# Patient Record
Sex: MALE | Race: WHITE | NOT HISPANIC OR LATINO
[De-identification: names, ages, dates, MRNs, and addresses within clinical notes are randomized per-mention and may not be internally consistent; named-entity substitution may affect disease eponyms.]

---

## 2019-06-05 ENCOUNTER — APPOINTMENT (OUTPATIENT)
Dept: ORTHOPEDIC SURGERY | Facility: CLINIC | Age: 84
End: 2019-06-05
Payer: MEDICARE

## 2019-06-05 DIAGNOSIS — Z60.2 PROBLEMS RELATED TO LIVING ALONE: ICD-10-CM

## 2019-06-05 DIAGNOSIS — Z87.39 PERSONAL HISTORY OF OTHER DISEASES OF THE MUSCULOSKELETAL SYSTEM AND CONNECTIVE TISSUE: ICD-10-CM

## 2019-06-05 PROCEDURE — 20610 DRAIN/INJ JOINT/BURSA W/O US: CPT | Mod: RT

## 2019-06-05 PROCEDURE — 73562 X-RAY EXAM OF KNEE 3: CPT | Mod: LT

## 2019-06-05 PROCEDURE — 99203 OFFICE O/P NEW LOW 30 MIN: CPT | Mod: 25

## 2019-06-05 PROCEDURE — 73564 X-RAY EXAM KNEE 4 OR MORE: CPT | Mod: RT

## 2019-06-05 SDOH — SOCIAL STABILITY - SOCIAL INSECURITY: PROBLEMS RELATED TO LIVING ALONE: Z60.2

## 2019-06-05 NOTE — ADDENDUM
[FreeTextEntry1] : This note was written by Maine Ying on 06/05/2019 acting as scribe for Dr. Carson Lombardi M.D.\par \par I, Dr. Carson Lombardi M.D., have read and attest that all the information, medical decision making and discharge instructions within are true and accurate.\par

## 2019-06-05 NOTE — PROCEDURE
[de-identified] : RIGHT KNEE CORTISONE INJECTION\par Discussed at length with the patient the planned steroid and lidocaine injection. The risks, benefits, convalescence and alternatives were reviewed. The possible side effects discussed included but were not limited to: pain, swelling, heat and redness. There symptoms are generally mild but if they are extensive then contact the office. Giving pain relievers by mouth such as NSAID’s or Tylenol can generally treat the reactions to  steroid and lidocaine. Rare cases of infection have been noted. Rash, hives and itching may occur post injection. If you have muscle pain or cramps, flushing and or swelling of the face, rapid heart beat, nausea, dizziness, fever, chills, headache, difficulty breathing, swelling in the arms or legs, or have a prickly feeling of your skin, contact a health care provider immediately.\par  \par Following this discussion, the knee was prepped with betadine and under sterile conditions 9 cc of 1% lidocaine and 1 cc (40 mg) of Depo-Medrol were injected with a 21 gauge needle. The needle was introduced into the joint, aspiration was performed to ensure intra-articular placement and the medication was injected. Upon withdrawal of the needle the site was cleaned with alcohol and a bandaid applied. The patient tolerated the injection well and there were no adverse effects. Post injection instructions included no strenuous activity for 24 hours, cryotherapy and if there are any adverse effects to contact the office.

## 2019-06-05 NOTE — HISTORY OF PRESENT ILLNESS
[de-identified] : 83 year old male presents for evaluation of bilateral knee pain, R>L for the past few years. Patient had a left TKR with Dr. Lombardi in 2011. He describes several falls over several years. He locates his right knee pain diffusely throughout the joint and around the kneecap. He describes intermittent left knee pain which is located diffusely.  His pain is a dull discomfort with symptoms of stiffness, right knee buckling, as well as loss of motion. He can ambulate < 1 block and takes the stairs one at a time using the handrail without a cane, walker, or brace. Currently using Tylenol prn and has history of a right knee cortisone injection last year followed by viscosupplementation at [facility]. He feels his right knee is becoming a progressive problem, and has been becoming progressively weak. Today, he would like to discuss his treatment options with Dr. Lombadri, including TKR.

## 2019-06-05 NOTE — PHYSICAL EXAM
[de-identified] : General appearance: well nourished and hydrated, pleasant, alert and oriented x 3, cooperative.\par HEENT: Normocephalic, EOM intact, Nasal septum midline, Oral cavity clear, External auditory canal clear.\par Cardiovascular: no apparent abnormalities, no lower leg edema, no varicosities, pedal pulses are decreased. \par Lymphatics Lymph nodes: none palpated, Lymphedema: not present.\par Neurologic: sensation is normal, no muscle weakness in upper or lower extremities, patella tendon reflexes intact .\par Dermatologic no apparent skin lesions, moist, warm, no rash.\par Spine: cervical spine appears normal and moves freely, thoracic spine appears normal and moves freely, lumbosacral spine appears normal and moves freely.\par Gait: nonantalgic.\par \par Left knee\par Inspection: no effusion or erythema.\par Wounds: healed midline incision \par Alignment: normal.\par Palpation: no specific tenderness on palpation.\par ROM active (in degrees): 0-130\par Ligamentous laxity: all ligaments appear stable,, negative ant. drawer test, negative post. drawer test, stable to varus stress test, stable to valgus stress test.\par Patellofemoral Alignment Test: Q angle-, normal.\par Muscle Test: good quad strength.\par Leg examination: calf is soft and non-tender.\par \par Right knee\par Inspection: trace effusion \par Wounds: none.\par Alignment: normal.\par Palpation: no specific tenderness on palpation.\par ROM active (in degrees): 0-130 with crepitus and discomfort \par Ligamentous laxity: all ligaments appear stable,, negative ant. drawer test, negative post. drawer test, stable to varus stress test, stable to valgus stress test. negative Lachman's test, negative pivot shift test\par Meniscal Test: negative McMurrays, negative Jared.\par Patellofemoral Alignment Test: Q angle-, normal.\par Muscle Test: good quad strength.\par Leg examination: calf is soft and non-tender.\par \par Left hip\par Inspection: No swelling or ecchymosis.\par Wounds: none.\par Palpation: non-tender.\par Stability: no instability.\par Strength: 5/5 all motor groups.\par ROM: no pain with FROM.\par Leg length: equal.\par \par Right hip\par Inspection: No swelling or ecchymosis.\par Wounds: none.\par Palpation: non-tender.\par Stability: no instability.\par Strength: 5/5 all motor groups.\par ROM: no pain with FROM.\par Leg length: equal.\par \par Left ankle\par Inspection: no erythema noted, no swelling noted.\par Palpation: no pain on palpation .\par ROM: FROM without crepitus.\par Muscle strength: 5/5.\par Stability: no instability noted.\par \par Right ankle\par Inspection: no erythema noted, no swelling noted.\par ROM: FROM without crepitus.\par Palpation: no pain on palpation .\par Muscle strength: 5/5.\par Stability: no instability noted.\par \par Left foot\par Inspection: color, texture and turgor are normal.\par ROM: full range of motion of all joints without pain or crepitus.\par Palpation: no tenderness.\par Stability: no instability noted.\par \par Right foot\par Inspection: color, texture and turgor are normal.\par ROM: full range of motion of all joints without pain or crepitus.\par Palpation: no tenderness.\par Stability: no instability noted.\par \par bilateral feet: pes planus [de-identified] : Left knee xrays, AP, lateral, merchant view taken at the office today demonstrates a total knee replacement with mobile prosthesis in satisfactory position and alignment. No evidence of loosening. The patella sits in a central position, patella peterson noted. \par \par Right  knee xrays, standing AP/Lateral, Merchant films, and 45 degree PA standing view taken at the office today show diffuse tricompartmental degenerative arthritis, lateral joint space narrowing, bone on bone with sclerosis especially on the Arthur view, small peripheral, patella sits in a central position, patella peterson noted. significant patellofemoral joint space narrowing with lateral tracking and significant wear lateral facet, Kellgren and Enrike grade 3 with severe patellofemoral arthritis.

## 2019-06-05 NOTE — DISCUSSION/SUMMARY
[de-identified] : Discussed at length the nature of the patients condition. His left TKR is overall doing well now at 8 years. I fell that his residual symptoms are muscular in nature and encouraged him to undergo PT. I have reassured him that his implants are functioning well. Regarding his right knee, his symptoms are related to degenerative arthritis. We reviewed operative and nonoperative treatment. I am not sure if he is a surgical candidate at this time and recommended he visit his PCP to check on his overall health and wellbeing. Therefore, we will continue with nonoperative treatment at this time. Patient was given a right knee cortisone injection today as detailed above for symptomatic relief. We will seek authorization for right knee Euflexxa injections. Once we receive authorization, we will proceed accordingly. I also suggested Tylenol for pain prn and a course of PT. They can continue activities as tolerated. This was explained in the presence of their son.

## 2019-10-02 PROBLEM — Z60.2 PERSON LIVING ALONE: Status: ACTIVE | Noted: 2019-06-05

## 2019-10-30 ENCOUNTER — APPOINTMENT (OUTPATIENT)
Dept: ORTHOPEDIC SURGERY | Facility: CLINIC | Age: 84
End: 2019-10-30
Payer: MEDICARE

## 2019-10-30 VITALS — BODY MASS INDEX: 27.47 KG/M2 | HEIGHT: 67 IN | WEIGHT: 175 LBS

## 2019-10-30 DIAGNOSIS — M25.561 PAIN IN RIGHT KNEE: ICD-10-CM

## 2019-10-30 PROCEDURE — 73562 X-RAY EXAM OF KNEE 3: CPT | Mod: RT

## 2019-10-30 PROCEDURE — 99213 OFFICE O/P EST LOW 20 MIN: CPT | Mod: 25

## 2019-10-30 PROCEDURE — 20610 DRAIN/INJ JOINT/BURSA W/O US: CPT | Mod: RT

## 2019-10-30 PROCEDURE — 73560 X-RAY EXAM OF KNEE 1 OR 2: CPT | Mod: LT

## 2019-10-30 NOTE — HISTORY OF PRESENT ILLNESS
[de-identified] : 84 year old male presents for follow up evaluation of right knee pain due to degenerative arthritis. He states his right knee has been increasingly symptomatic. Patient is not a surgical candidate at this time. He reports increased stiffness and pain and would like to try nonoperative measures including viscosupplementation and steroid injections.

## 2019-10-30 NOTE — PROCEDURE
[de-identified] : RIGHT KNEE CORTISONE INJECTION\par Discussed at length with the patient the planned steroid and lidocaine injection. The risks, benefits, convalescence and alternatives were reviewed. The possible side effects discussed included but were not limited to: pain, swelling, heat and redness. There symptoms are generally mild but if they are extensive then contact the office. Giving pain relievers by mouth such as NSAID’s or Tylenol can generally treat the reactions to  steroid and lidocaine. Rare cases of infection have been noted. Rash, hives and itching may occur post injection. If you have muscle pain or cramps, flushing and or swelling of the face, rapid heart beat, nausea, dizziness, fever, chills, headache, difficulty breathing, swelling in the arms or legs, or have a prickly feeling of your skin, contact a health care provider immediately.\par  \par Following this discussion, the knee was prepped with betadine and under sterile conditions 9 cc of 1% lidocaine and 1 cc (40 mg) of Depo-Medrol were injected with a 21 gauge needle. The needle was introduced into the joint, aspiration was performed to ensure intra-articular placement and the medication was injected. Upon withdrawal of the needle the site was cleaned with alcohol and a bandaid applied. The patient tolerated the injection well and there were no adverse effects. Post injection instructions included no strenuous activity for 24 hours, cryotherapy and if there are any adverse effects to contact the office.

## 2019-10-30 NOTE — ADDENDUM
[FreeTextEntry1] : This note was written by Maine Ying on 10/30/2019 acting as scribe for Dr. Carson Lombardi M.D.\par \par I, Dr. Carson Lombardi M.D., have read and attest that all the information, medical decision making and discharge instructions within are true and accurate.

## 2019-10-30 NOTE — DISCUSSION/SUMMARY
[de-identified] : Discussed at length the nature of the patients condition. Their RIGHT knee symptoms appear secondary to degenerative arthritis. We reviewed operative and nonoperative treatment. While I believe he would eventually benefit from a right TKR, he is not a surgical candidate. Therefore, we will continue nonoperatively. We will seek authorization for RIGHT knee Euflexxa injections. Once we receive authorization, we will proceed accordingly. In the interim, patient was given a RIGHT knee cortisone injection today as detailed above for symptomatic relief. I also suggested home exercise and Tylenol prn. They can continue activities as tolerated.\par

## 2019-10-30 NOTE — PHYSICAL EXAM
[de-identified] : Right knee\par Inspection: trace effusion \par Wounds: none.\par Alignment: normal.\par Palpation: medial tenderness on palpation, especially medial femoral condyle\par ROM active (in degrees): with crepitus and discomfort \par Ligamentous laxity: all ligaments appear stable,, negative ant. drawer test, negative post. drawer test, stable to varus stress test, stable to valgus stress test. negative Lachman's test, negative pivot shift test\par Meniscal Test: negative McMurrays, negative Jared.\par Patellofemoral Alignment Test: Q angle-, normal.\par Muscle Test: good quad strength.\par Leg examination: calf is soft and non-tender. [de-identified] : Left knee xray merchant view taken at the office today demonstrates a total knee replacement in satisfactory position and alignment with the patella in a central position \par \par Right  knee xrays, standing AP/Lateral, Merchant films taken at the office today show diffuse tricompartmental degenerative arthritis, lateral joint space narrowing, bone on bone with sclerosis especially on the Arthur view, small peripheral, patella sits in a central position, patella peterson noted. significant patellofemoral joint space narrowing with lateral tracking and significant wear lateral facet, Kellgren and Enrike grade 3 with severe patellofemoral arthritis.

## 2020-03-18 ENCOUNTER — APPOINTMENT (OUTPATIENT)
Dept: ORTHOPEDIC SURGERY | Facility: CLINIC | Age: 85
End: 2020-03-18

## 2020-03-25 ENCOUNTER — APPOINTMENT (OUTPATIENT)
Dept: ORTHOPEDIC SURGERY | Facility: CLINIC | Age: 85
End: 2020-03-25

## 2020-04-01 ENCOUNTER — APPOINTMENT (OUTPATIENT)
Dept: ORTHOPEDIC SURGERY | Facility: CLINIC | Age: 85
End: 2020-04-01

## 2021-07-13 ENCOUNTER — NON-APPOINTMENT (OUTPATIENT)
Age: 86
End: 2021-07-13

## 2021-07-13 ENCOUNTER — LABORATORY RESULT (OUTPATIENT)
Age: 86
End: 2021-07-13

## 2021-07-13 ENCOUNTER — APPOINTMENT (OUTPATIENT)
Dept: INTERNAL MEDICINE | Facility: CLINIC | Age: 86
End: 2021-07-13
Payer: MEDICARE

## 2021-07-13 VITALS
SYSTOLIC BLOOD PRESSURE: 172 MMHG | DIASTOLIC BLOOD PRESSURE: 77 MMHG | HEIGHT: 63.5 IN | BODY MASS INDEX: 29.4 KG/M2 | TEMPERATURE: 97.5 F | WEIGHT: 168 LBS | HEART RATE: 65 BPM | OXYGEN SATURATION: 96 %

## 2021-07-13 DIAGNOSIS — J30.2 OTHER SEASONAL ALLERGIC RHINITIS: ICD-10-CM

## 2021-07-13 DIAGNOSIS — R94.31 ABNORMAL ELECTROCARDIOGRAM [ECG] [EKG]: ICD-10-CM

## 2021-07-13 DIAGNOSIS — Z86.39 PERSONAL HISTORY OF OTHER ENDOCRINE, NUTRITIONAL AND METABOLIC DISEASE: ICD-10-CM

## 2021-07-13 DIAGNOSIS — Z82.61 FAMILY HISTORY OF ARTHRITIS: ICD-10-CM

## 2021-07-13 DIAGNOSIS — M17.10 UNILATERAL PRIMARY OSTEOARTHRITIS, UNSPECIFIED KNEE: ICD-10-CM

## 2021-07-13 DIAGNOSIS — F43.23 ADJUSTMENT DISORDER WITH MIXED ANXIETY AND DEPRESSED MOOD: ICD-10-CM

## 2021-07-13 PROCEDURE — 93000 ELECTROCARDIOGRAM COMPLETE: CPT | Mod: 59

## 2021-07-13 PROCEDURE — G0439: CPT

## 2021-07-13 RX ORDER — HYDRALAZINE HYDROCHLORIDE 10 MG/1
10 TABLET ORAL
Refills: 0 | Status: DISCONTINUED | COMMUNITY
End: 2021-07-13

## 2021-07-14 ENCOUNTER — NON-APPOINTMENT (OUTPATIENT)
Age: 86
End: 2021-07-14

## 2021-07-14 LAB
25(OH)D3 SERPL-MCNC: 39.9 NG/ML
ALBUMIN SERPL ELPH-MCNC: 4.6 G/DL
ALP BLD-CCNC: 106 U/L
ALT SERPL-CCNC: 15 U/L
ANION GAP SERPL CALC-SCNC: 15 MMOL/L
APPEARANCE: CLEAR
AST SERPL-CCNC: 15 U/L
BACTERIA: ABNORMAL
BASOPHILS # BLD AUTO: 0.08 K/UL
BASOPHILS NFR BLD AUTO: 1 %
BILIRUB DIRECT SERPL-MCNC: 0.1 MG/DL
BILIRUB INDIRECT SERPL-MCNC: 0.3 MG/DL
BILIRUB SERPL-MCNC: 0.4 MG/DL
BILIRUBIN URINE: NEGATIVE
BLOOD URINE: NORMAL
BUN SERPL-MCNC: 48 MG/DL
CALCIUM SERPL-MCNC: 9.2 MG/DL
CHLORIDE SERPL-SCNC: 109 MMOL/L
CHOLEST SERPL-MCNC: 137 MG/DL
CO2 SERPL-SCNC: 18 MMOL/L
COLOR: NORMAL
CREAT SERPL-MCNC: 3.57 MG/DL
EOSINOPHIL # BLD AUTO: 0.4 K/UL
EOSINOPHIL NFR BLD AUTO: 4.8 %
ESTIMATED AVERAGE GLUCOSE: 111 MG/DL
FERRITIN SERPL-MCNC: 64 NG/ML
FOLATE SERPL-MCNC: >20 NG/ML
GLUCOSE QUALITATIVE U: NEGATIVE
GLUCOSE SERPL-MCNC: 86 MG/DL
HBA1C MFR BLD HPLC: 5.5 %
HCT VFR BLD CALC: 40 %
HDLC SERPL-MCNC: 50 MG/DL
HGB BLD-MCNC: 12.6 G/DL
HYALINE CASTS: 1 /LPF
IMM GRANULOCYTES NFR BLD AUTO: 0.5 %
IRON SERPL-MCNC: 55 UG/DL
KETONES URINE: NEGATIVE
LDLC SERPL CALC-MCNC: 61 MG/DL
LEUKOCYTE ESTERASE URINE: NEGATIVE
LYMPHOCYTES # BLD AUTO: 1.6 K/UL
LYMPHOCYTES NFR BLD AUTO: 19.3 %
MAN DIFF?: NORMAL
MCHC RBC-ENTMCNC: 30 PG
MCHC RBC-ENTMCNC: 31.5 GM/DL
MCV RBC AUTO: 95.2 FL
MICROSCOPIC-UA: NORMAL
MONOCYTES # BLD AUTO: 0.96 K/UL
MONOCYTES NFR BLD AUTO: 11.6 %
NEUTROPHILS # BLD AUTO: 5.21 K/UL
NEUTROPHILS NFR BLD AUTO: 62.8 %
NITRITE URINE: NEGATIVE
NONHDLC SERPL-MCNC: 87 MG/DL
PH URINE: 6
PLATELET # BLD AUTO: 211 K/UL
POTASSIUM SERPL-SCNC: 4.1 MMOL/L
PROT SERPL-MCNC: 7.6 G/DL
PROTEIN URINE: ABNORMAL
RBC # BLD: 4.2 M/UL
RBC # FLD: 13.1 %
RED BLOOD CELLS URINE: 2 /HPF
SAVE SPECIMEN: NORMAL
SODIUM SERPL-SCNC: 143 MMOL/L
SPECIFIC GRAVITY URINE: 1.02
SQUAMOUS EPITHELIAL CELLS: 0 /HPF
TRIGL SERPL-MCNC: 129 MG/DL
TSH SERPL-ACNC: 1.87 UIU/ML
UROBILINOGEN URINE: NORMAL
VIT B12 SERPL-MCNC: 801 PG/ML
WBC # FLD AUTO: 8.29 K/UL
WHITE BLOOD CELLS URINE: 1 /HPF

## 2021-07-14 NOTE — PHYSICAL EXAM
[Normal] : no respiratory distress, lungs were clear to auscultation bilaterally and no accessory muscle use [Normal Rate] : normal rate  [Regular Rhythm] : with a regular rhythm [Normal S1, S2] : normal S1 and S2 [No Varicosities] : no varicosities [Pedal Pulses Present] : the pedal pulses are present [No Edema] : there was no peripheral edema [No Extremity Clubbing/Cyanosis] : no extremity clubbing/cyanosis [Soft] : abdomen soft [Non Tender] : non-tender [Non-distended] : non-distended [Normal Bowel Sounds] : normal bowel sounds [Normal Posterior Cervical Nodes] : no posterior cervical lymphadenopathy [Normal Anterior Cervical Nodes] : no anterior cervical lymphadenopathy [No Rash] : no rash [de-identified] : hard of hearing  [de-identified] : +systolic murmur  [de-identified] : unsteady gait  [de-identified] : anxious at times

## 2021-07-14 NOTE — ASSESSMENT
[FreeTextEntry1] : 85 year old male h/o HTN, CKD, BPH, Adjustment disorder presents for initial annual exam. \par was following with Dr. Gordon, last seen ~3 years ago.  \par \par since was living in assisting living in [place] for the last 4 days, under care of niece, now new POA completed, as  nephew, Abel acting as POA.\par As per patient, was unhappy at facility.  Niece (not sister of Abel) was not listening to patient's concerns regarding facility.  As per patient and him, facility was not helpful with safe discharge therefore Abel left with patient on sunday and has been living with himself for the time being \par hasn’t been on meds in the last several days, 3x times a day.  \par form needs for Assisted living in the area, [facility] vs [facility].  \par -in my opinion patient has capacity to make decisions for himself and has elected arianne Roman as his POA and HCP (form scanned into chart)\par -will check labs, including quantiferon gold \par \par Abnormal ecg, seen by cardio many years ago \par -cardio evaluation \par \par h/o osteoarthritis, worse in right knee \par -ortho f/u \par -pt referral \par \par Persistent diarrhea, ~3x a day.  no fever/chills, n/v or abdominal pain.  no recent abx or sick contacts.  \par -check stool studies \par -will check labs \par \par HTN, stable \par -restart norvasc 5mg qd, hold hydralazine for now\par \par Decreased pedal pulses, h/o PAD?, not on statin/asa or vasodilator\par -cardiology evaluation\par \par CKD, unsure of baseline \par \par Annual \par -Advised to get yearly Flu shot \par -Advised Yearly Eye exam with Ophthalmologist\par -Advised Yearly Dental exam\par -Educated of the importance of Healthy diet, such as Mediterranean Diet and Exercise, such as walking >20 minutes a day and increasing gradually as tolerated.  \par \par \par \par \par \par \par \par

## 2021-07-14 NOTE — HISTORY OF PRESENT ILLNESS
[de-identified] : 85 year old male h/o HTN, CKD, BPH, Adjustment disorder presents for initial annual exam. \par was following with Dr. Gordon, last seen ~3 years ago \par \par since was living in assisting living in [place] for the last 4 days, under care of niece, now new POA completed, as  nephew, Abel acting as POA.\par As per patient, was unhappy at facility.  Niece (not sister of Abel) was not listening to patient's concerns regarding facility.  As per patient and him, facility was not helpful with safe discharge therefore Abel left with patient on sunday and has been living with himself for the time being \par \par hasn’t been on meds in the last several days, 3x times a day.  \par \par form needs for Assisted living in the area, [facility].  \par \par patient c/o persistent diarrhea, ~3x a day.  no fever/chills, n/v or abdominal pain.  no recent abx or sick contacts.  \par \par \par

## 2021-07-14 NOTE — HEALTH RISK ASSESSMENT
[With Patient/Caregiver] : , with patient/caregiver [Reviewed no changes] : Reviewed, no changes [Designated Healthcare Proxy] : Designated healthcare proxy [Name: ___] : Health Care Proxy's Name: [unfilled]  [Relationship: ___] : Relationship: [unfilled] [I will adhere to the patient's wishes.] : I will adhere to the patient's wishes. [Good] : ~his/her~  mood as  good [No] : No [0] : 2) Feeling down, depressed, or hopeless: Not at all (0) [Feels Safe at Home] : Feels safe at home [Some assistance needed] : doing laundry [Full assistance needed] : managing finances [Reports changes in hearing] : Reports changes in hearing [] : No [[id number]] : 0 [Sexually Active] : not sexually active [Reports changes in vision] : Reports no changes in vision [de-identified] : hard of hearing  [AdvancecareDate] : 07/13/2021 [FreeTextEntry4] : forms scanning into chart

## 2021-07-14 NOTE — COUNSELING
[Fall prevention counseling provided] : Fall prevention counseling provided [Adequate lighting] : Adequate lighting [No throw rugs] : No throw rugs [Use proper foot wear] : Use proper foot wear [Use recommended devices] : Use recommended devices [FreeTextEntry1] : PT referral  [Behavioral health counseling provided] : Behavioral health counseling provided

## 2021-07-15 ENCOUNTER — NON-APPOINTMENT (OUTPATIENT)
Age: 86
End: 2021-07-15

## 2021-07-15 ENCOUNTER — EMERGENCY (EMERGENCY)
Facility: HOSPITAL | Age: 86
LOS: 1 days | Discharge: HOME | End: 2021-07-15
Attending: EMERGENCY MEDICINE
Payer: MEDICARE

## 2021-07-15 VITALS
HEART RATE: 70 BPM | DIASTOLIC BLOOD PRESSURE: 71 MMHG | OXYGEN SATURATION: 97 % | SYSTOLIC BLOOD PRESSURE: 176 MMHG | RESPIRATION RATE: 16 BRPM

## 2021-07-15 VITALS
HEART RATE: 63 BPM | HEIGHT: 67 IN | TEMPERATURE: 98 F | RESPIRATION RATE: 20 BRPM | DIASTOLIC BLOOD PRESSURE: 73 MMHG | SYSTOLIC BLOOD PRESSURE: 138 MMHG

## 2021-07-15 DIAGNOSIS — Z98.89 OTHER SPECIFIED POSTPROCEDURAL STATES: Chronic | ICD-10-CM

## 2021-07-15 LAB
ALBUMIN SERPL ELPH-MCNC: 4.5 G/DL — SIGNIFICANT CHANGE UP (ref 3.3–5)
ALP SERPL-CCNC: 97 U/L — SIGNIFICANT CHANGE UP (ref 40–120)
ALT FLD-CCNC: 13 U/L — SIGNIFICANT CHANGE UP (ref 10–45)
ANION GAP SERPL CALC-SCNC: 16 MMOL/L — SIGNIFICANT CHANGE UP (ref 5–17)
APPEARANCE UR: CLEAR — SIGNIFICANT CHANGE UP
AST SERPL-CCNC: 13 U/L — SIGNIFICANT CHANGE UP (ref 10–40)
BACTERIA # UR AUTO: NEGATIVE — SIGNIFICANT CHANGE UP
BASOPHILS # BLD AUTO: 0.05 K/UL — SIGNIFICANT CHANGE UP (ref 0–0.2)
BASOPHILS NFR BLD AUTO: 0.6 % — SIGNIFICANT CHANGE UP (ref 0–2)
BILIRUB SERPL-MCNC: 0.4 MG/DL — SIGNIFICANT CHANGE UP (ref 0.2–1.2)
BILIRUB UR-MCNC: NEGATIVE — SIGNIFICANT CHANGE UP
BUN SERPL-MCNC: 55 MG/DL — HIGH (ref 7–23)
CALCIUM SERPL-MCNC: 9.3 MG/DL — SIGNIFICANT CHANGE UP (ref 8.4–10.5)
CHLORIDE SERPL-SCNC: 109 MMOL/L — HIGH (ref 96–108)
CO2 SERPL-SCNC: 17 MMOL/L — LOW (ref 22–31)
COLOR SPEC: SIGNIFICANT CHANGE UP
CREAT SERPL-MCNC: 3.53 MG/DL — HIGH (ref 0.5–1.3)
DIFF PNL FLD: ABNORMAL
EOSINOPHIL # BLD AUTO: 0.55 K/UL — HIGH (ref 0–0.5)
EOSINOPHIL NFR BLD AUTO: 6.1 % — HIGH (ref 0–6)
EPI CELLS # UR: 0 /HPF — SIGNIFICANT CHANGE UP
GLUCOSE SERPL-MCNC: 88 MG/DL — SIGNIFICANT CHANGE UP (ref 70–99)
GLUCOSE UR QL: NEGATIVE — SIGNIFICANT CHANGE UP
HCT VFR BLD CALC: 39.2 % — SIGNIFICANT CHANGE UP (ref 39–50)
HGB BLD-MCNC: 12.5 G/DL — LOW (ref 13–17)
HYALINE CASTS # UR AUTO: 1 /LPF — SIGNIFICANT CHANGE UP (ref 0–2)
IMM GRANULOCYTES NFR BLD AUTO: 0.6 % — SIGNIFICANT CHANGE UP (ref 0–1.5)
KETONES UR-MCNC: NEGATIVE — SIGNIFICANT CHANGE UP
LEUKOCYTE ESTERASE UR-ACNC: NEGATIVE — SIGNIFICANT CHANGE UP
LYMPHOCYTES # BLD AUTO: 1.64 K/UL — SIGNIFICANT CHANGE UP (ref 1–3.3)
LYMPHOCYTES # BLD AUTO: 18.1 % — SIGNIFICANT CHANGE UP (ref 13–44)
MCHC RBC-ENTMCNC: 29.8 PG — SIGNIFICANT CHANGE UP (ref 27–34)
MCHC RBC-ENTMCNC: 31.9 GM/DL — LOW (ref 32–36)
MCV RBC AUTO: 93.6 FL — SIGNIFICANT CHANGE UP (ref 80–100)
MONOCYTES # BLD AUTO: 0.91 K/UL — HIGH (ref 0–0.9)
MONOCYTES NFR BLD AUTO: 10 % — SIGNIFICANT CHANGE UP (ref 2–14)
NEUTROPHILS # BLD AUTO: 5.88 K/UL — SIGNIFICANT CHANGE UP (ref 1.8–7.4)
NEUTROPHILS NFR BLD AUTO: 64.6 % — SIGNIFICANT CHANGE UP (ref 43–77)
NITRITE UR-MCNC: NEGATIVE — SIGNIFICANT CHANGE UP
NRBC # BLD: 0 /100 WBCS — SIGNIFICANT CHANGE UP (ref 0–0)
PH UR: 6 — SIGNIFICANT CHANGE UP (ref 5–8)
PLATELET # BLD AUTO: 201 K/UL — SIGNIFICANT CHANGE UP (ref 150–400)
POTASSIUM SERPL-MCNC: 4.2 MMOL/L — SIGNIFICANT CHANGE UP (ref 3.5–5.3)
POTASSIUM SERPL-SCNC: 4.2 MMOL/L — SIGNIFICANT CHANGE UP (ref 3.5–5.3)
PROT SERPL-MCNC: 8 G/DL — SIGNIFICANT CHANGE UP (ref 6–8.3)
PROT UR-MCNC: 100 — SIGNIFICANT CHANGE UP
RBC # BLD: 4.19 M/UL — LOW (ref 4.2–5.8)
RBC # FLD: 12.9 % — SIGNIFICANT CHANGE UP (ref 10.3–14.5)
RBC CASTS # UR COMP ASSIST: 6 /HPF — HIGH (ref 0–4)
SODIUM SERPL-SCNC: 142 MMOL/L — SIGNIFICANT CHANGE UP (ref 135–145)
SODIUM UR-SCNC: 72 MMOL/L — SIGNIFICANT CHANGE UP
SP GR SPEC: 1.01 — SIGNIFICANT CHANGE UP (ref 1.01–1.02)
URATE SERPL-MCNC: 6.8 MG/DL — SIGNIFICANT CHANGE UP (ref 3.4–8.8)
UROBILINOGEN FLD QL: NEGATIVE — SIGNIFICANT CHANGE UP
WBC # BLD: 9.08 K/UL — SIGNIFICANT CHANGE UP (ref 3.8–10.5)
WBC # FLD AUTO: 9.08 K/UL — SIGNIFICANT CHANGE UP (ref 3.8–10.5)
WBC UR QL: 0 /HPF — SIGNIFICANT CHANGE UP (ref 0–5)

## 2021-07-15 PROCEDURE — 80053 COMPREHEN METABOLIC PANEL: CPT

## 2021-07-15 PROCEDURE — 84300 ASSAY OF URINE SODIUM: CPT

## 2021-07-15 PROCEDURE — 99284 EMERGENCY DEPT VISIT MOD MDM: CPT | Mod: 25

## 2021-07-15 PROCEDURE — 81001 URINALYSIS AUTO W/SCOPE: CPT

## 2021-07-15 PROCEDURE — 82570 ASSAY OF URINE CREATININE: CPT

## 2021-07-15 PROCEDURE — 84550 ASSAY OF BLOOD/URIC ACID: CPT

## 2021-07-15 PROCEDURE — 84560 ASSAY OF URINE/URIC ACID: CPT

## 2021-07-15 PROCEDURE — 76770 US EXAM ABDO BACK WALL COMP: CPT | Mod: 26

## 2021-07-15 PROCEDURE — 93010 ELECTROCARDIOGRAM REPORT: CPT

## 2021-07-15 PROCEDURE — 99285 EMERGENCY DEPT VISIT HI MDM: CPT | Mod: 25

## 2021-07-15 PROCEDURE — 76770 US EXAM ABDO BACK WALL COMP: CPT

## 2021-07-15 PROCEDURE — 85025 COMPLETE CBC W/AUTO DIFF WBC: CPT

## 2021-07-15 NOTE — ED ADULT TRIAGE NOTE - CHIEF COMPLAINT QUOTE
pt with abnormal blood lab results fredrick anthony who states was in a home for 14 mos they took him out 4 days ago brought him to be checked at md yesterday

## 2021-07-15 NOTE — ED ADULT NURSE NOTE - CCCP TRG CHIEF CMPLNT
abnormal lab results Dermal Autograft Text: The defect edges were debeveled with a #15 scalpel blade.  Given the location of the defect, shape of the defect and the proximity to free margins a dermal autograft was deemed most appropriate.  Using a sterile surgical marker, the primary defect shape was transferred to the donor site. The area thus outlined was incised deep to adipose tissue with a #15 scalpel blade.  The harvested graft was then trimmed of adipose and epidermal tissue until only dermis was left.  The skin graft was then placed in the primary defect and oriented appropriately.

## 2021-07-15 NOTE — ED ADULT NURSE NOTE - NSIMPLEMENTINTERV_GEN_ALL_ED
Implemented All Fall with Harm Risk Interventions:  [place] to call system. Call bell, personal items and telephone within reach. Instruct patient to call for assistance. Room bathroom lighting operational. Non-slip footwear when patient is off stretcher. Physically safe environment: no spills, clutter or unnecessary equipment. Stretcher in lowest position, wheels locked, appropriate side rails in place. Provide visual cue, wrist band, yellow gown, etc. Monitor gait and stability. Monitor for mental status changes and reorient to person, place, and time. Review medications for side effects contributing to fall risk. Reinforce activity limits and safety measures with patient and family. Provide visual clues: red socks.

## 2021-07-15 NOTE — ED PROVIDER NOTE - NSFOLLOWUPINSTRUCTIONS_ED_ALL_ED_FT
See your Primary Doctor this week for follow up -- call to discuss.    Stay well hydrated, eat regular healthy meals, get plenty of rest, continue current medications.    Seek immediate medical care for new/worsening symptoms/concerns.

## 2021-07-15 NOTE — ED PROVIDER NOTE - CARE PLAN
Principal Discharge DX:	Renal failure (ARF), acute on chronic  Secondary Diagnosis:	Dehydration, mild

## 2021-07-15 NOTE — ED PROVIDER NOTE - PSH
H/O hernia repair  RIGHT  History of laparoscopic cholecystectomy    S/P TKR (total knee replacement)  LEFT  S/P TURP

## 2021-07-15 NOTE — ED PROVIDER NOTE - PATIENT PORTAL LINK FT
You can access the FollowMyHealth Patient Portal offered by [facility] by registering at the following website: [email or web address]. By joining [employer]’s FollowMyHealth portal, you will also be able to view your health information using other applications (apps) compatible with our system.

## 2021-07-15 NOTE — ED PROVIDER NOTE - OBJECTIVE STATEMENT
85 year old M with PMH HTN, BPH referred from PCP's for increased creatinine on routine labwork. Patient has chronic urinary frequency and retention 2/2 BPH, but denies hematuria or dysuria. Denies fevers, N/V/D, abdominal pain, CP, SOB, LE edema.

## 2021-07-15 NOTE — ED PROVIDER NOTE - ATTENDING CONTRIBUTION TO CARE
------------ATTENDING NOTE------------   pt w/ nephew c/o worsened creatinine 2 days ago on routine blood testing, pt asymptomatic, he has unchanged urinary retention but no pain, at his baseline mental/functional status (has dementia), awaiting labs and d/w pt's PCP -->  - Alfredo Fuller MD   ---------------------------------------------- ------------ATTENDING NOTE------------   pt w/ nephew c/o worsened creatinine 2 days ago on routine blood testing, pt asymptomatic, he has unchanged urinary retention but no pain, at his baseline mental/functional status (has dementia), awaiting labs and d/w pt's PCP --> pt remained asymptomatic, labs at his baseline, tolerating PO, nml VS at dc, in depth dw all about ddx, tx, araiza, continued close outpt kristyn d/w PCP.  - Alfredo Fuller MD   ----------------------------------------------

## 2021-07-15 NOTE — ED PROVIDER NOTE - PROGRESS NOTE DETAILS
Nikhil, PGY2: Spoke with on call Dr. Thompson; agrees with Dr. Crespo's note; repeat labs + perform renal ultrasound to evaluate for hydro. Further chart checking indicates patient's last blood work in 2017 showed a Cr of 2.13; symptoms appear acute-chronic CKD. If no obstruction is present, patient can be safe for discharge.

## 2021-07-15 NOTE — ED ADULT NURSE REASSESSMENT NOTE - NS ED NURSE REASSESS COMMENT FT1
Received report from ED RN Kalpana; patient A&Ox3, afebrile- hypertensive but denies headache, dizziness, visual changes, nausea and vomiting, 20 g IV in R AC patent and site WNL, patient pending lab results and disposition.

## 2021-07-15 NOTE — ED ADULT NURSE NOTE - OBJECTIVE STATEMENT
male 85 years old with past medical history of HTN, alert and orientedx3 came in for abnormal labs. As per nephew pt. was home for 14 months, 4 days ago they brought him to his PCP for check up, had blood works done yesterday, was called today to bring pt to ED for "kidney issues". Pt denies chest pain, sob, abdominal pain, N/V, fever, cough or urinary symptoms. Denies any discomfort. Evaluated by MD. Safety maintained.

## 2021-07-16 ENCOUNTER — NON-APPOINTMENT (OUTPATIENT)
Age: 86
End: 2021-07-16

## 2021-07-16 LAB — URATE UR-MCNC: 20.3 MG/DL — SIGNIFICANT CHANGE UP

## 2021-07-30 LAB
BACTERIA STL CULT: NORMAL
C DIFF TOX GENS STL QL NAA+PROBE: NORMAL
CDIFF BY PCR: NOT DETECTED
DEPRECATED O AND P PREP STL: NORMAL
M TB IFN-G BLD-IMP: NEGATIVE
PANCREATIC ELASTASE, FECAL: 403
QUANTIFERON TB PLUS MITOGEN MINUS NIL: 3.04 IU/ML
QUANTIFERON TB PLUS NIL: 0.01 IU/ML
QUANTIFERON TB PLUS TB1 MINUS NIL: 0 IU/ML
QUANTIFERON TB PLUS TB2 MINUS NIL: 0 IU/ML

## 2021-08-09 ENCOUNTER — APPOINTMENT (OUTPATIENT)
Dept: UROLOGY | Facility: CLINIC | Age: 86
End: 2021-08-09
Payer: MEDICARE

## 2021-08-09 PROCEDURE — 51798 US URINE CAPACITY MEASURE: CPT

## 2021-08-09 PROCEDURE — 99204 OFFICE O/P NEW MOD 45 MIN: CPT | Mod: 25

## 2021-08-09 NOTE — HISTORY OF PRESENT ILLNESS
[FreeTextEntry1] : patient here with nephew - Nadine - power of \par had PSA done ( for unclear reasons given patient age and current AUA guidelines to stop screening for pca by age 69 ) and was found to be elevated\par also with LUTS of frequney and nocturia ( most bothered) and some INC \par no dysuria or heamturia\par no fam hx of pca

## 2021-08-09 NOTE — REVIEW OF SYSTEMS
[Feeling Tired] : feeling tired [Wake up at night to urinate  How many times?  ___] : wakes up to urinate [unfilled] times during the night [Itching] : itching [Difficulty Walking] : difficulty walking [Anxiety] : anxiety [Negative] : Heme/Lymph [FreeTextEntry3] : Sinus [FreeTextEntry2] : HBP

## 2021-08-09 NOTE — PHYSICAL EXAM
[General Appearance - Well Developed] : well developed [General Appearance - Well Nourished] : well nourished [Normal Appearance] : normal appearance [Well Groomed] : well groomed [General Appearance - In No Acute Distress] : no acute distress [Edema] : no peripheral edema [Respiration, Rhythm And Depth] : normal respiratory rhythm and effort [Exaggerated Use Of Accessory Muscles For Inspiration] : no accessory muscle use [Abdomen Soft] : soft [Abdomen Tenderness] : non-tender [Abdomen Mass (___ Cm)] : no abdominal mass palpated [Costovertebral Angle Tenderness] : no ~M costovertebral angle tenderness [Abdomen Hernia] : no hernia was discovered [FreeTextEntry1] : pvr- 100m l [Prostate Tenderness] : the prostate was not tender [No Prostate Nodules] : no prostate nodules [Prostate Size ___ gm] : prostate size [unfilled] gm [Normal Station and Gait] : the gait and station were normal for the patient's age [] : no rash [No Focal Deficits] : no focal deficits [Oriented To Time, Place, And Person] : oriented to person, place, and time [Affect] : the affect was normal [Mood] : the mood was normal [Not Anxious] : not anxious [Cervical Lymph Nodes Enlarged Posterior Bilaterally] : posterior cervical [Cervical Lymph Nodes Enlarged Anterior Bilaterally] : anterior cervical [Supraclavicular Lymph Nodes Enlarged Bilaterally] : supraclavicular

## 2021-08-09 NOTE — ASSESSMENT
[FreeTextEntry1] : patient here with nephew - Nadine - power of \par had PSA done ( for unclear reasons given patient age)  and was found to be elevated\par current AUA guidelines recommend stopping screening for pca by age 69 \par on flomax for luts\par discussed to repeat psa with total and free\par prostate vol based on recent COBY ( done for eleva creat of 3.57) 59 ml with no hydro bilat \par will check urine\par will contact with results\par could add proscar to flomax and repeat psa in 6m to see if dec by 50 %^ as with BPH faby if not pursuing bx \par stil bothered by LUTS despite flomax and interested in additinoal meds\par

## 2021-08-11 ENCOUNTER — NON-APPOINTMENT (OUTPATIENT)
Age: 86
End: 2021-08-11

## 2021-08-11 LAB
APPEARANCE: CLEAR
BACTERIA UR CULT: NORMAL
BACTERIA: NEGATIVE
BILIRUBIN URINE: NEGATIVE
BLOOD URINE: NORMAL
COLOR: NORMAL
GLUCOSE QUALITATIVE U: NEGATIVE
HYALINE CASTS: 2 /LPF
KETONES URINE: NEGATIVE
LEUKOCYTE ESTERASE URINE: NEGATIVE
MICROSCOPIC-UA: NORMAL
NITRITE URINE: NEGATIVE
PH URINE: 6
PROTEIN URINE: ABNORMAL
PSA FREE FLD-MCNC: 38 %
PSA FREE SERPL-MCNC: 7.63 NG/ML
PSA SERPL-MCNC: 20.2 NG/ML
RED BLOOD CELLS URINE: 6 /HPF
SPECIFIC GRAVITY URINE: 1.02
SQUAMOUS EPITHELIAL CELLS: 0 /HPF
UROBILINOGEN URINE: NORMAL
WHITE BLOOD CELLS URINE: 1 /HPF

## 2021-08-25 ENCOUNTER — APPOINTMENT (OUTPATIENT)
Dept: ORTHOPEDIC SURGERY | Facility: CLINIC | Age: 86
End: 2021-08-25
Payer: MEDICARE

## 2021-08-25 VITALS — HEIGHT: 63 IN | BODY MASS INDEX: 29.77 KG/M2 | WEIGHT: 168 LBS

## 2021-08-25 DIAGNOSIS — Z96.652 PRESENCE OF LEFT ARTIFICIAL KNEE JOINT: ICD-10-CM

## 2021-08-25 PROCEDURE — 20610 DRAIN/INJ JOINT/BURSA W/O US: CPT | Mod: RT

## 2021-08-25 PROCEDURE — 99214 OFFICE O/P EST MOD 30 MIN: CPT | Mod: 25

## 2021-08-25 PROCEDURE — 73564 X-RAY EXAM KNEE 4 OR MORE: CPT | Mod: RT

## 2021-08-25 PROCEDURE — 73562 X-RAY EXAM OF KNEE 3: CPT | Mod: LT

## 2021-08-25 NOTE — DISCUSSION/SUMMARY
[de-identified] : Discussed at length the nature of the patients condition. His left TKR is doing well. Their RIGHT knee symptoms appear secondary to degenerative arthritis. We reviewed operative and nonoperative treatment. While I believe he would eventually benefit from a right TKR, he is not a surgical candidate. Therefore, we will continue nonoperatively. \par \par We will seek authorization for RIGHT knee Euflexxa injections. Once we receive authorization, we will proceed accordingly. In the interim, patient was given a RIGHT knee cortisone injection today as detailed above for symptomatic relief. I also suggested home exercise, physical therapy, weight management, and Tylenol prn. \par \par Patient can continue activities as tolerated. All questions answered, understanding verbalized. Patient in agreement with plan of care.  This was explained in the presence of their nephew

## 2021-08-25 NOTE — PROCEDURE
[de-identified] : RIGHT KNEE CORTISONE INJECTION\par \par Discussed at length with the patient the planned steroid and lidocaine injection. The risks, benefits, convalescence and alternatives were reviewed. The possible side effects discussed included but were not limited to: pain, swelling, heat and redness. These symptoms are generally mild but if they are extensive then contact the office. Giving pain relievers by mouth such as NSAID’s or Tylenol can generally treat the reactions to steroid and lidocaine. Rare cases of infection have been noted. Rash, hives and itching may occur post injection. If you have muscle pain or cramps, flushing and or swelling of the face, rapid heart beat, nausea, dizziness, fever, chills, headache, difficulty breathing, swelling in the arms or legs, or have a prickly feeling of your skin, contact a health care provider immediately.\par \par Following this discussion, the knee was prepped with betadine and under sterile conditions 5 cc of 2% lidocaine and 1 cc depo-medrol were injected with a 21 gauge needle. The needle was introduced into the joint, aspiration was performed to ensure intra-articular placement and the medication was injected. Upon withdrawal of the needle the site was cleaned with alcohol and a bandaid applied. The patient tolerated the injection well and there were no adverse effects. Post injection instructions included no strenuous activity for 24 hours, cryotherapy and if there are any adverse effects to contact the office.

## 2021-08-25 NOTE — HISTORY OF PRESENT ILLNESS
[de-identified] : KOMAL SORENSEN is a 85 year old male who presents for follow-up evaluation bilateral knees. Patient is s/p left TKR March 2011 doing well. His right knee is currently symptomatic today. He had cortisone in this office about 3 years ago. He was in a rehab facility over the past year. He had gel injection around May 2021. He denies taking pain medication. The pain is worse with activities. He reports intermittent sharp pain with walking. He has been using a rolling walker. Patient accompanied by his nephew.

## 2021-08-25 NOTE — PHYSICAL EXAM
[de-identified] : Right knee\par Inspection: trace effusion \par Wounds: none.\par Alignment: normal.\par Palpation: medial tenderness on palpation, \par ROM active (in degrees): 5 -90 with crepitus and discomfort \par Ligamentous laxity: all ligaments appear stable,, negative ant. drawer test, negative post. drawer test, stable to varus stress test, stable to valgus stress test. negative Lachman's test, negative pivot shift test\par Meniscal Test: negative McMurrays, negative Jared.\par Patellofemoral Alignment Test: Q angle-, normal.\par Muscle Test: good quad strength.\par Leg examination: calf is soft and non-tender.\par \par Left Knee\par Inspection: no effusion\par Wounds: healed midline incision\par Alignment: normal.\par Palpation: no specific tenderness on palpation.\par ROM: Active (in degrees): 0 -130\par Ligamentous laxity (neg): negative ant. drawer test, negative post. drawer test, stable to varus stress test, stable to valgus stress test,\par Patellofemoral Alignment Test: Q angle-, normal.\par Muscle Test: good quad strength.\par Leg examination: calf is soft and non-tender.  [de-identified] : Left knee xray merchant view taken at the office today demonstrates a total knee replacement in satisfactory position and alignment with the patella in a central position \par \par Right  knee xrays, standing AP/Lateral, Merchant films taken at the office today show diffuse tricompartmental degenerative arthritis, lateral joint space narrowing, bone on bone with sclerosis especially on the Arthur view, small peripheral, patella sits in a central position, patella peterson noted. significant patellofemoral joint space narrowing with lateral tracking and significant wear lateral facet, Kellgren and Enrike grade 3 -4, patellofemoral arthritis.

## 2021-08-25 NOTE — ADDENDUM
[FreeTextEntry1] : This note was written by Ron Lim on 08/25/2021  acting as scribe for Dr. Carson Lombardi M.D.\par \par I, Dr. Carson Lombardi, have read and attest that all the information, medical decision making and discharge instructions within are true and accurate.

## 2021-09-21 ENCOUNTER — NON-APPOINTMENT (OUTPATIENT)
Age: 86
End: 2021-09-21

## 2021-09-21 ENCOUNTER — APPOINTMENT (OUTPATIENT)
Dept: GASTROENTEROLOGY | Facility: CLINIC | Age: 86
End: 2021-09-21
Payer: MEDICARE

## 2021-09-21 VITALS
SYSTOLIC BLOOD PRESSURE: 144 MMHG | WEIGHT: 183 LBS | HEIGHT: 68 IN | HEART RATE: 72 BPM | DIASTOLIC BLOOD PRESSURE: 76 MMHG | BODY MASS INDEX: 27.74 KG/M2

## 2021-09-21 PROCEDURE — 99204 OFFICE O/P NEW MOD 45 MIN: CPT

## 2021-09-21 NOTE — ASSESSMENT
[FreeTextEntry1] : 1.  Diarrhea x 1.5 years.  May be medication induced (escitalopram).  Differential includes microscopic colitis, functional GI disorder.  Prior infectious stool studies negative.  TSH and HbA1C within normal limits.  IBD less likely given normal.\par 2.  Elevated PSA (20).\par 3.  CKD.\par 4.  Anxiety/ adjustment disorder.\par 5.  HTN.\par 6.  Poor memory.\par \par Recs:\par - Prior labs and stool studies reviewed.\par - Check CT A/P (noncontrast).\par - Start loperamide at bedtime and as needed during the day.\par - Patient and nephew were advised to speak with PCP about potentially discontinuing/ switching escitalopram.\par - If symptoms not improved in 1-2 months, can consider further labs to evaluate for celiac disease, neuroendocrine disorders.\par - While endoscopic evaluation can be considered, given patient's age and comorbidities, conservative measures will be attempted first.

## 2021-09-21 NOTE — PHYSICAL EXAM
[General Appearance - Alert] : alert [General Appearance - In No Acute Distress] : in no acute distress [Sclera] : the sclera and conjunctiva were normal [PERRL With Normal Accommodation] : pupils were equal in size, round, and reactive to light [Outer Ear] : the ears and nose were normal in appearance [Neck Cervical Mass (___cm)] : no neck mass was observed [Auscultation Breath Sounds / Voice Sounds] : lungs were clear to auscultation bilaterally [Heart Rate And Rhythm] : heart rate was normal and rhythm regular [Heart Sounds] : normal S1 and S2 [Edema] : there was no peripheral edema [Bowel Sounds] : normal bowel sounds [Abdomen Soft] : soft [Abdomen Tenderness] : non-tender [] : no hepato-splenomegaly [Abdomen Mass (___ Cm)] : no abdominal mass palpated [Abdomen Hernia] : no hernia was discovered [No Rectal Mass] : no rectal mass [External Hemorrhoid] : no external hemorrhoids [Occult Blood Positive] : stool was negative for occult blood [Prostate Enlarged] : was enlarged [Supraclavicular Lymph Nodes Enlarged Bilaterally] : supraclavicular [No CVA Tenderness] : no ~M costovertebral angle tenderness [Skin Color & Pigmentation] : normal skin color and pigmentation [Skin Turgor] : normal skin turgor [FreeTextEntry1] : poor memory

## 2021-09-21 NOTE — REVIEW OF SYSTEMS
[Feeling Tired] : feeling tired [Diarrhea] : diarrhea [Negative] : Heme/Lymph [FreeTextEntry7] : Pain below ribs

## 2021-09-21 NOTE — HISTORY OF PRESENT ILLNESS
[Heartburn] : denies heartburn [Nausea] : denies nausea [Vomiting] : denies vomiting [Diarrhea] : stable diarrhea [Constipation] : denies constipation [Yellow Skin Or Eyes (Jaundice)] : denies jaundice [Abdominal Pain] : denies abdominal pain [Abdominal Swelling] : denies abdominal swelling [Rectal Pain] : denies rectal pain [de-identified] : Gopi presents to the office today with his nephew (Lizzie TORREZ) for evaluation of loose stools.\par \par The patient reports a change in bowel habits with loose stools over the past 1.5 years.  He reports that symptoms changed after he started living in a nursing home/ assisted living ("for crazy people") about 1.5 years ago.  His diet had changed but he reports eating mostly bland food.  Due to worsening depression and anxiety, he was also started on escitalopram at bedtime.  The patient has been having 3-4 soft, nonbloody BMs almost daily.  He previously had 2 formed stools a day.  He can have incontinence of both urine or stool and needs to wear adult diapers.  He does wake up multiple times at night to urinate but it is unclear if he also passes stool at that time.  He reports a good appetite and denies any upper GI symptoms.  His weight has increased since he moved back to [place] this past summer.  He reports 2 prior colonoscopies, many years ago.

## 2021-09-24 ENCOUNTER — OUTPATIENT (OUTPATIENT)
Dept: OUTPATIENT SERVICES | Facility: HOSPITAL | Age: 86
LOS: 1 days | End: 2021-09-24
Payer: MEDICARE

## 2021-09-24 ENCOUNTER — APPOINTMENT (OUTPATIENT)
Dept: CT IMAGING | Facility: CLINIC | Age: 86
End: 2021-09-24

## 2021-09-24 VITALS — WEIGHT: 183 LBS | BODY MASS INDEX: 27.74 KG/M2 | HEIGHT: 68 IN

## 2021-09-24 DIAGNOSIS — Z98.89 OTHER SPECIFIED POSTPROCEDURAL STATES: Chronic | ICD-10-CM

## 2021-09-24 DIAGNOSIS — R19.7 DIARRHEA, UNSPECIFIED: ICD-10-CM

## 2021-09-24 PROCEDURE — 74176 CT ABD & PELVIS W/O CONTRAST: CPT

## 2021-09-24 PROCEDURE — 74176 CT ABD & PELVIS W/O CONTRAST: CPT | Mod: 26,MH

## 2021-09-24 RX ORDER — ESCITALOPRAM OXALATE 5 MG/1
5 TABLET ORAL
Qty: 90 | Refills: 3 | Status: DISCONTINUED | COMMUNITY
End: 2021-09-24

## 2021-09-27 ENCOUNTER — NON-APPOINTMENT (OUTPATIENT)
Age: 86
End: 2021-09-27

## 2021-10-01 ENCOUNTER — APPOINTMENT (OUTPATIENT)
Dept: ORTHOPEDIC SURGERY | Facility: CLINIC | Age: 86
End: 2021-10-01
Payer: MEDICARE

## 2021-10-01 VITALS — BODY MASS INDEX: 27.74 KG/M2 | WEIGHT: 183 LBS | HEIGHT: 68 IN

## 2021-10-01 PROCEDURE — 20610 DRAIN/INJ JOINT/BURSA W/O US: CPT | Mod: RT

## 2021-10-01 NOTE — PROCEDURE
[de-identified] : Euflexxa #1 Right knee\par Discussed at length with the patient the planned Euflexxa injection. The risks, benefits, convalescence and alternatives were reviewed. The possible side effects discussed included but were not limited to: pain, swelling, heat and redness. There symptoms are generally mild but if they are extensive then contact the office. Giving pain relievers by mouth such as NSAID’s or Tylenol can generally treat the reactions to Euflexxa. Rare cases of infection have been noted. Rash, hives and itching may occur post injection. If you have muscle pain or cramps, flushing and or swelling of the face, rapid heart beat, nausea, dizziness, fever, chills, headache, difficulty breathing, swelling in the arms or legs, or have a prickly feeling of your skin, contact a health care provider immediately.\par \par Following this discussion, the knee was prepped with betadine and under sterile condition the Euflexxa injection was performed with a 22 gauge needle. The needle was introduced into the joint, aspiration was performed to ensure intra-articular placement and the medication was injected. Upon withdrawal of the needle the site was cleaned with alcohol and a bandaid applied. The patient tolerated the injection well and there were no adverse effects. Post injection instructions included no strenuous activity for 24 hours, cryotherapy and if there are any adverse effects to contact the office.\par

## 2021-10-03 VITALS — HEIGHT: 68 IN | BODY MASS INDEX: 27.74 KG/M2 | WEIGHT: 183 LBS

## 2021-10-08 ENCOUNTER — APPOINTMENT (OUTPATIENT)
Dept: ORTHOPEDIC SURGERY | Facility: CLINIC | Age: 86
End: 2021-10-08
Payer: MEDICARE

## 2021-10-08 PROCEDURE — 20610 DRAIN/INJ JOINT/BURSA W/O US: CPT | Mod: RT

## 2021-10-08 NOTE — PROCEDURE
[de-identified] : Euflexxa # 2 Right Knee\par Discussed at length with the patient the planned Euflexxa injection. The risks, benefits, convalescence and alternatives were reviewed. The possible side effects discussed included but were not limited to: pain, swelling, heat and redness. There symptoms are generally mild but if they are extensive then contact the office. Giving pain relievers by mouth such as NSAID’s or Tylenol can generally treat the reactions to Euflexxa. Rare cases of infection have been noted. Rash, hives and itching may occur post injection. If you have muscle pain or cramps, flushing and or swelling of the face, rapid heart beat, nausea, dizziness, fever, chills, headache, difficulty breathing, swelling in the arms or legs, or have a prickly feeling of your skin, contact a health care provider immediately.\par \par Following this discussion, the knee was prepped with betadine and under sterile condition the Euflexxa injection was performed with a 22 gauge needle. The needle was introduced into the joint, aspiration was performed to ensure intra-articular placement and the medication was injected. Upon withdrawal of the needle the site was cleaned with alcohol and a bandaid applied. The patient tolerated the injection well and there were no adverse effects. Post injection instructions included no strenuous activity for 24 hours, cryotherapy and if there are any adverse effects to contact the office.\par

## 2021-10-11 VITALS — HEIGHT: 68 IN | WEIGHT: 183 LBS | BODY MASS INDEX: 27.74 KG/M2

## 2021-10-15 ENCOUNTER — APPOINTMENT (OUTPATIENT)
Dept: ORTHOPEDIC SURGERY | Facility: CLINIC | Age: 86
End: 2021-10-15
Payer: MEDICARE

## 2021-10-15 PROCEDURE — 20610 DRAIN/INJ JOINT/BURSA W/O US: CPT | Mod: RT

## 2021-10-15 NOTE — PROCEDURE
[de-identified] : Euflexxa #3 Right knee\par Discussed at length with the patient the planned Euflexxa injection. The risks, benefits, convalescence and alternatives were reviewed. The possible side effects discussed included but were not limited to: pain, swelling, heat and redness. There symptoms are generally mild but if they are extensive then contact the office. Giving pain relievers by mouth such as NSAID’s or Tylenol can generally treat the reactions to Euflexxa. Rare cases of infection have been noted. Rash, hives and itching may occur post injection. If you have muscle pain or cramps, flushing and or swelling of the face, rapid heart beat, nausea, dizziness, fever, chills, headache, difficulty breathing, swelling in the arms or legs, or have a prickly feeling of your skin, contact a health care provider immediately.\par \par Following this discussion, the knee was prepped with betadine and under sterile condition the Euflexxa injection was performed with a 22 gauge needle. The needle was introduced into the joint, aspiration was performed to ensure intra-articular placement and the medication was injected. Upon withdrawal of the needle the site was cleaned with alcohol and a bandaid applied. The patient tolerated the injection well and there were no adverse effects. Post injection instructions included no strenuous activity for 24 hours, cryotherapy and if there are any adverse effects to contact the office.\par

## 2021-10-25 ENCOUNTER — APPOINTMENT (OUTPATIENT)
Dept: INTERNAL MEDICINE | Facility: CLINIC | Age: 86
End: 2021-10-25
Payer: MEDICARE

## 2021-10-25 VITALS
WEIGHT: 184 LBS | OXYGEN SATURATION: 95 % | SYSTOLIC BLOOD PRESSURE: 138 MMHG | BODY MASS INDEX: 27.89 KG/M2 | HEIGHT: 68 IN | HEART RATE: 88 BPM | DIASTOLIC BLOOD PRESSURE: 78 MMHG

## 2021-10-25 DIAGNOSIS — L03.116 CELLULITIS OF LEFT LOWER LIMB: ICD-10-CM

## 2021-10-25 PROCEDURE — 36415 COLL VENOUS BLD VENIPUNCTURE: CPT

## 2021-10-25 PROCEDURE — 99214 OFFICE O/P EST MOD 30 MIN: CPT | Mod: 25

## 2021-10-25 NOTE — REVIEW OF SYSTEMS
[Diarrhea] : diarrhea [Itching] : itching [Skin Rash] : skin rash [Unsteady Walk] : ataxia [Negative] : Psychiatric

## 2021-10-25 NOTE — ASSESSMENT
[FreeTextEntry1] : 85 year old male h/o HTN, CKD, BPH, Adjustment disorder presents for follow-up.\par \par LLE cellulitis, started on cephalexin 2 days ago due to left lower leg infection.  States prior to elective been somewhat itchy.  Applies over-the-counter itching cream with good relief.  Noticed some diarrhea yesterday.  Denies any fever, chills, nausea, vomiting, melena or abdominal pain.\par -cont cephalexin for now \par -will check labs, mainly cr given h/o CKD, stage iv\par dressing changes daily \par -start probiotic\par -monitor for worsening symptoms \par -may get dermatology evaluation regarding itching \par \par \par \par \par \par \par \par \par \par

## 2021-10-25 NOTE — PHYSICAL EXAM
[Normal] : no respiratory distress, lungs were clear to auscultation bilaterally and no accessory muscle use [Normal Rate] : normal rate  [Regular Rhythm] : with a regular rhythm [Normal S1, S2] : normal S1 and S2 [No Varicosities] : no varicosities [Pedal Pulses Present] : the pedal pulses are present [No Edema] : there was no peripheral edema [No Extremity Clubbing/Cyanosis] : no extremity clubbing/cyanosis [Soft] : abdomen soft [Non Tender] : non-tender [Non-distended] : non-distended [Normal Bowel Sounds] : normal bowel sounds [Normal Posterior Cervical Nodes] : no posterior cervical lymphadenopathy [Normal Anterior Cervical Nodes] : no anterior cervical lymphadenopathy [de-identified] : hard of hearing  [de-identified] : +systolic murmur  [de-identified] : Erythematous lesion over left leg.  Nontender, no fluctuance or surrounding edema.  Noted break in the skin with some dried blood on dressing. [de-identified] : unsteady gait  [de-identified] : anxious at times

## 2021-10-25 NOTE — HISTORY OF PRESENT ILLNESS
[Other: _____] : [unfilled] [de-identified] : 85 year old male h/o HTN, CKD, BPH, Adjustment disorder presents for follow-up.\par \par Presents with nephew after being started on cephalexin due to left lower leg infection.  States prior to elective been somewhat itchy.  Applies over-the-counter itching cream with good relief.  Noticed some diarrhea yesterday.  Denies any fever, chills, nausea, vomiting, melena or abdominal pain.\par \par \par

## 2021-10-28 LAB
ALBUMIN SERPL ELPH-MCNC: 4.6 G/DL
ALP BLD-CCNC: 118 U/L
ALT SERPL-CCNC: 11 U/L
ANION GAP SERPL CALC-SCNC: 14 MMOL/L
AST SERPL-CCNC: 11 U/L
BASOPHILS # BLD AUTO: 0.06 K/UL
BASOPHILS NFR BLD AUTO: 0.6 %
BILIRUB DIRECT SERPL-MCNC: 0.2 MG/DL
BILIRUB INDIRECT SERPL-MCNC: 0.3 MG/DL
BILIRUB SERPL-MCNC: 0.5 MG/DL
BUN SERPL-MCNC: 72 MG/DL
CALCIUM SERPL-MCNC: 9.4 MG/DL
CHLORIDE SERPL-SCNC: 105 MMOL/L
CHOLEST SERPL-MCNC: 165 MG/DL
CO2 SERPL-SCNC: 22 MMOL/L
CREAT SERPL-MCNC: 4.17 MG/DL
CRP SERPL-MCNC: 9 MG/L
EOSINOPHIL # BLD AUTO: 0.44 K/UL
EOSINOPHIL NFR BLD AUTO: 4.6 %
ERYTHROCYTE [SEDIMENTATION RATE] IN BLOOD BY WESTERGREN METHOD: 65 MM/HR
ESTIMATED AVERAGE GLUCOSE: 137 MG/DL
FOLATE SERPL-MCNC: 12.1 NG/ML
GLUCOSE SERPL-MCNC: 111 MG/DL
HBA1C MFR BLD HPLC: 6.4 %
HCT VFR BLD CALC: 43.4 %
HDLC SERPL-MCNC: 68 MG/DL
HGB BLD-MCNC: 13.4 G/DL
IMM GRANULOCYTES NFR BLD AUTO: 0.7 %
LDLC SERPL CALC-MCNC: 81 MG/DL
LYMPHOCYTES # BLD AUTO: 1.6 K/UL
LYMPHOCYTES NFR BLD AUTO: 16.9 %
MAN DIFF?: NORMAL
MCHC RBC-ENTMCNC: 29.3 PG
MCHC RBC-ENTMCNC: 30.9 GM/DL
MCV RBC AUTO: 94.8 FL
MONOCYTES # BLD AUTO: 0.95 K/UL
MONOCYTES NFR BLD AUTO: 10 %
NEUTROPHILS # BLD AUTO: 6.36 K/UL
NEUTROPHILS NFR BLD AUTO: 67.2 %
NONHDLC SERPL-MCNC: 96 MG/DL
PLATELET # BLD AUTO: 245 K/UL
POTASSIUM SERPL-SCNC: 5.3 MMOL/L
PROT SERPL-MCNC: 7.7 G/DL
RBC # BLD: 4.58 M/UL
RBC # FLD: 13.3 %
SODIUM SERPL-SCNC: 140 MMOL/L
TRIGL SERPL-MCNC: 76 MG/DL
TSH SERPL-ACNC: 2.08 UIU/ML
VIT B12 SERPL-MCNC: 729 PG/ML
WBC # FLD AUTO: 9.48 K/UL

## 2021-11-12 ENCOUNTER — RX RENEWAL (OUTPATIENT)
Age: 86
End: 2021-11-12

## 2021-11-15 DIAGNOSIS — F41.9 ANXIETY DISORDER, UNSPECIFIED: ICD-10-CM

## 2021-11-17 ENCOUNTER — APPOINTMENT (OUTPATIENT)
Dept: UROLOGY | Facility: CLINIC | Age: 86
End: 2021-11-17
Payer: MEDICARE

## 2021-11-17 DIAGNOSIS — R39.89 OTHER SYMPTOMS AND SIGNS INVOLVING THE GENITOURINARY SYSTEM: ICD-10-CM

## 2021-11-17 DIAGNOSIS — R97.20 ELEVATED PROSTATE, SPECIFIC ANTIGEN [PSA]: ICD-10-CM

## 2021-11-17 PROCEDURE — 99214 OFFICE O/P EST MOD 30 MIN: CPT

## 2021-11-17 NOTE — ASSESSMENT
[FreeTextEntry1] : patient here with JANN ( POA) for f/u after starting proscar with flomax for past 3 m for elev psa of 20\par also found to have microhematuria of 6 rbc\par since then has had CT scan ( no IV) that showed hyperdense cyst in the Right kidney 1.2 cm with large prostte and bladder 'within normal lilmits'\par tolerating flomax and proscar\par no real chane in luts \par recommend:\par 1)repeat psa today along with ua\par 2) brochure for cysto if urine still with blood and JANN agrees to invasive procedure\par cont proscar and flomax - hope for sl dec in psa-- if so - f/u 6m

## 2021-11-17 NOTE — HISTORY OF PRESENT ILLNESS
[FreeTextEntry1] : patient here with JANN ( POA) for f/u after starting proscar with flomax for past 3 m for elev psa of 20\par also found to have microhematuria of 6 rbc\par since then has had CT scan ( no IV) that showed hyperdense cyst in the Right kidney 1.2 cm with large prostte and bladder 'within normal lilmits'\par tolerating flomax and proscar\par no real change in luts \par

## 2021-11-17 NOTE — PHYSICAL EXAM
[General Appearance - Well Developed] : well developed [General Appearance - Well Nourished] : well nourished [Normal Appearance] : normal appearance [Well Groomed] : well groomed [General Appearance - In No Acute Distress] : no acute distress [Abdomen Soft] : soft [Abdomen Tenderness] : non-tender [] : no hepato-splenomegaly [Abdomen Mass (___ Cm)] : no abdominal mass palpated [Abdomen Hernia] : no hernia was discovered [Costovertebral Angle Tenderness] : no ~M costovertebral angle tenderness [FreeTextEntry1] : due to void 137 ml

## 2021-11-19 LAB — PSA SERPL-MCNC: 12 NG/ML

## 2021-11-24 ENCOUNTER — CLINICAL ADVICE (OUTPATIENT)
Age: 86
End: 2021-11-24

## 2021-11-24 LAB
ALBUMIN SERPL ELPH-MCNC: 4.2 G/DL
ALP BLD-CCNC: 120 U/L
ALT SERPL-CCNC: 16 U/L
ANION GAP SERPL CALC-SCNC: 21 MMOL/L
AST SERPL-CCNC: 22 U/L
BASOPHILS # BLD AUTO: 0.06 K/UL
BASOPHILS NFR BLD AUTO: 0.7 %
BILIRUB DIRECT SERPL-MCNC: 0.1 MG/DL
BILIRUB INDIRECT SERPL-MCNC: 0.2 MG/DL
BILIRUB SERPL-MCNC: 0.3 MG/DL
BUN SERPL-MCNC: 56 MG/DL
CALCIUM SERPL-MCNC: 8.8 MG/DL
CHLORIDE SERPL-SCNC: 105 MMOL/L
CHOLEST SERPL-MCNC: 167 MG/DL
CO2 SERPL-SCNC: 15 MMOL/L
CREAT SERPL-MCNC: 4.4 MG/DL
EOSINOPHIL # BLD AUTO: 0.35 K/UL
EOSINOPHIL NFR BLD AUTO: 4.3 %
GLUCOSE SERPL-MCNC: 154 MG/DL
HCT VFR BLD CALC: 40.4 %
HDLC SERPL-MCNC: 51 MG/DL
HGB BLD-MCNC: 12.6 G/DL
IMM GRANULOCYTES NFR BLD AUTO: 1.5 %
LDLC SERPL CALC-MCNC: 80 MG/DL
LYMPHOCYTES # BLD AUTO: 1.57 K/UL
LYMPHOCYTES NFR BLD AUTO: 19.3 %
MAN DIFF?: NORMAL
MCHC RBC-ENTMCNC: 28.6 PG
MCHC RBC-ENTMCNC: 31.2 GM/DL
MCV RBC AUTO: 91.6 FL
MONOCYTES # BLD AUTO: 0.8 K/UL
MONOCYTES NFR BLD AUTO: 9.8 %
NEUTROPHILS # BLD AUTO: 5.23 K/UL
NEUTROPHILS NFR BLD AUTO: 64.4 %
NONHDLC SERPL-MCNC: 116 MG/DL
PLATELET # BLD AUTO: 263 K/UL
POTASSIUM SERPL-SCNC: 4.9 MMOL/L
PROT SERPL-MCNC: 7.4 G/DL
RBC # BLD: 4.41 M/UL
RBC # FLD: 12.7 %
SODIUM SERPL-SCNC: 141 MMOL/L
TRIGL SERPL-MCNC: 181 MG/DL
WBC # FLD AUTO: 8.13 K/UL

## 2021-12-07 ENCOUNTER — LABORATORY RESULT (OUTPATIENT)
Age: 86
End: 2021-12-07

## 2021-12-07 ENCOUNTER — APPOINTMENT (OUTPATIENT)
Dept: NEPHROLOGY | Facility: CLINIC | Age: 86
End: 2021-12-07
Payer: MEDICARE

## 2021-12-07 ENCOUNTER — NON-APPOINTMENT (OUTPATIENT)
Age: 86
End: 2021-12-07

## 2021-12-07 VITALS
OXYGEN SATURATION: 94 % | DIASTOLIC BLOOD PRESSURE: 71 MMHG | HEART RATE: 79 BPM | SYSTOLIC BLOOD PRESSURE: 147 MMHG | BODY MASS INDEX: 28.79 KG/M2 | TEMPERATURE: 97.2 F | HEIGHT: 68 IN | WEIGHT: 190 LBS

## 2021-12-07 VITALS — SYSTOLIC BLOOD PRESSURE: 130 MMHG | DIASTOLIC BLOOD PRESSURE: 70 MMHG

## 2021-12-07 DIAGNOSIS — Z87.438 PERSONAL HISTORY OF OTHER DISEASES OF MALE GENITAL ORGANS: ICD-10-CM

## 2021-12-07 DIAGNOSIS — N18.9 CHRONIC KIDNEY DISEASE, UNSPECIFIED: ICD-10-CM

## 2021-12-07 DIAGNOSIS — R31.29 OTHER MICROSCOPIC HEMATURIA: ICD-10-CM

## 2021-12-07 PROCEDURE — 99203 OFFICE O/P NEW LOW 30 MIN: CPT

## 2021-12-07 RX ORDER — SACCHAROMYCES BOULARDII 50 MG
CAPSULE ORAL
Qty: 90 | Refills: 0 | Status: DISCONTINUED | COMMUNITY
Start: 2021-10-25 | End: 2021-12-07

## 2021-12-07 RX ORDER — CEPHALEXIN 500 MG/1
500 CAPSULE ORAL
Refills: 0 | Status: DISCONTINUED | COMMUNITY
Start: 2021-10-25 | End: 2021-12-07

## 2021-12-07 NOTE — REASON FOR VISIT
[Consultation] : a consultation visit [Family Member] : family member [FreeTextEntry1] : Referred by PCP for stage V CKD

## 2021-12-07 NOTE — PHYSICAL EXAM
[General Appearance - Alert] : alert [General Appearance - In No Acute Distress] : in no acute distress [Sclera] : the sclera and conjunctiva were normal [Hearing Loss Right Only] : diminished [Hearing Loss Left Only] : dimished [Jugular Venous Distention Increased] : there was no jugular-venous distention [Auscultation Breath Sounds / Voice Sounds] : lungs were clear to auscultation bilaterally [Heart Sounds] : normal S1 and S2 [Systolic grade ___/6] : A grade [unfilled]/6 systolic murmur was heard. [Full Pulse] : the pedal pulses are present [Pitting Edema] : pitting edema present [___ +] : bilateral [unfilled]+ pitting edema to the ankles [Abdomen Tenderness] : non-tender [] : no hepato-splenomegaly [No Spinal Tenderness] : no spinal tenderness [FreeTextEntry1] : Scratch marks w/ crust in both lower extremitis [No Focal Deficits] : no focal deficits [Oriented To Time, Place, And Person] : oriented to person, place, and time

## 2021-12-07 NOTE — ASSESSMENT
[FreeTextEntry1] : 1. CKD stage V of unknown cause. The patient has no diabetes. He has no proteinuria.  There is no evidence of obstruction despite the enlarged prostate and he has no hydronephrosis. A renal US may be needed to exclude this issue as it is more sensitive than the CT scan.  Will screen for paraprotein associated renal disease w/ an SPEP and free serum light chain.  I have discussed with the patient and his nephew that he has stage V CKD, that dialysis may be imminent and that no superimposed reversible cause is apparent.  Will do the above work up and repeat his renal function today and will call the nephew back w/ the results in the morning.  Next visit he will be referred to the [facility] and will discuss vascular surgery consult for dialysis access. Not a candidate for PD. \par 2. BPH w/ elevated PSA. Urology follow up.\par 3. Microscopic hematuria. Likely related to BPH. Hyperdense cyst of some concern but no further work up. Will do sonogram.\par The patient will be seen in follow up the first week in January. He will call if he develops uremic symptoms like nausea, vomiting or loss of appetite. \par

## 2021-12-07 NOTE — REVIEW OF SYSTEMS
[Feeling Tired] : feeling tired [Loss Of Hearing] : hearing loss [Diarrhea] : diarrhea [Nocturia] : nocturia [Itching] : itching [Depression] : depression [Negative] : Neurological [FreeTextEntry9] : arthritis of both knees

## 2021-12-07 NOTE — CONSULT LETTER
[Dear  ___] : Dear  [unfilled], [Consult Letter:] : I had the pleasure of evaluating your patient, [unfilled]. [Please see my note below.] : Please see my note below. [Consult Closing:] : Thank you very much for allowing me to participate in the care of this patient.  If you have any questions, please do not hesitate to contact me. [Sincerely,] : Sincerely, [FreeTextEntry2] : Dr. Bud Crespo [FreeTextEntry1] : Planned work up: renal Ultrasound to rule out hydronephrosis (not present on recent CT scan).  Will also asses by US the hyperdense cyst. Will also screen for paraprotein related renal disease. Unlikely that the work up will reveal reversible cause of kidney disease.  Briefly discussed dialysis options. \par Will keep you in the loop.  [FreeTextEntry3] : Emeka Mccord MD

## 2021-12-07 NOTE — HISTORY OF PRESENT ILLNESS
[FreeTextEntry1] : Prior to this visit, I reviewed the patient's chart in [facility].  Labs from 2015.  The patient is currently living in an assisted living facility in [place] and his nephew Abel he is health care proxy.  Abel is present during today's visit. 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 Review of labs in 2016 revealed that his had some sort of serological work up which only showed a positive atypical ANCA.  His creatinine was around 2 at that time.  He moved from a nursing facility in [place] to assisted living in [place]. His PCP is Dr. Crespo who became concerned about his rising creatinine to 3.5 and now to 4.4.   The patient and his nephew are not aware of the severity of the patient kidney disease. \par The patient has nocturia up to 8 times per nigh. He does not sleep well and often sleeps during the day time. He has had some itching controlled w/ topical treatment. No nausea or vomiting. Fait to good appetite and no weight loss.  The patient and the nephew states that dialysis was never discussed with them. \par I have reviewed his past medical and surgical history. Recently he had an abdominal CT with oral contrast only for diarrhea. The kidneys did not show hydronephrosis. A 1.2 cm hyperdense cyst was noted. His prostate is enlarged. Last PSA 12, previous PSA 20.  His medications were reviewed at the time of this visit. \par The purpose of this initial visit is to establish the nature and the treatment and prognosis of his stage V CKD.

## 2021-12-08 ENCOUNTER — NON-APPOINTMENT (OUTPATIENT)
Age: 86
End: 2021-12-08

## 2021-12-08 LAB
ALBUMIN SERPL ELPH-MCNC: 4.4 G/DL
ALP BLD-CCNC: 108 U/L
ALT SERPL-CCNC: 10 U/L
ANION GAP SERPL CALC-SCNC: 15 MMOL/L
AST SERPL-CCNC: 13 U/L
BASOPHILS # BLD AUTO: 0.07 K/UL
BASOPHILS NFR BLD AUTO: 0.7 %
BILIRUB SERPL-MCNC: 0.3 MG/DL
BUN SERPL-MCNC: 51 MG/DL
CALCIUM SERPL-MCNC: 9.3 MG/DL
CHLORIDE SERPL-SCNC: 104 MMOL/L
CO2 SERPL-SCNC: 21 MMOL/L
CREAT SERPL-MCNC: 4.31 MG/DL
DEPRECATED KAPPA LC FREE/LAMBDA SER: 1.97 RATIO
EOSINOPHIL # BLD AUTO: 0.5 K/UL
EOSINOPHIL NFR BLD AUTO: 5.1 %
GLUCOSE SERPL-MCNC: 174 MG/DL
HCT VFR BLD CALC: 41.6 %
HGB BLD-MCNC: 13 G/DL
IMM GRANULOCYTES NFR BLD AUTO: 2 %
KAPPA LC CSF-MCNC: 5.65 MG/DL
KAPPA LC SERPL-MCNC: 11.13 MG/DL
LYMPHOCYTES # BLD AUTO: 1.71 K/UL
LYMPHOCYTES NFR BLD AUTO: 17.4 %
MAN DIFF?: NORMAL
MCHC RBC-ENTMCNC: 28.6 PG
MCHC RBC-ENTMCNC: 31.3 GM/DL
MCV RBC AUTO: 91.4 FL
MONOCYTES # BLD AUTO: 0.99 K/UL
MONOCYTES NFR BLD AUTO: 10.1 %
NEUTROPHILS # BLD AUTO: 6.37 K/UL
NEUTROPHILS NFR BLD AUTO: 64.7 %
PHOSPHATE SERPL-MCNC: 4.6 MG/DL
PLATELET # BLD AUTO: 259 K/UL
POTASSIUM SERPL-SCNC: 4.4 MMOL/L
PROT SERPL-MCNC: 7.7 G/DL
RBC # BLD: 4.55 M/UL
RBC # FLD: 13.2 %
SODIUM SERPL-SCNC: 140 MMOL/L
WBC # FLD AUTO: 9.84 K/UL

## 2021-12-16 LAB
ALBUMIN MFR SERPL ELPH: 52.7 %
ALBUMIN SERPL-MCNC: 4.1 G/DL
ALBUMIN/GLOB SERPL: 1.1 RATIO
ALPHA1 GLOB MFR SERPL ELPH: 4.7 %
ALPHA1 GLOB SERPL ELPH-MCNC: 0.4 G/DL
ALPHA2 GLOB MFR SERPL ELPH: 12.2 %
ALPHA2 GLOB SERPL ELPH-MCNC: 0.9 G/DL
B-GLOBULIN MFR SERPL ELPH: 12 %
B-GLOBULIN SERPL ELPH-MCNC: 0.9 G/DL
GAMMA GLOB FLD ELPH-MCNC: 1.4 G/DL
GAMMA GLOB MFR SERPL ELPH: 18.4 %
INTERPRETATION SERPL IEP-IMP: NORMAL
M PROTEIN MFR SERPL ELPH: NORMAL
MONOCLON BAND OBS SERPL: NORMAL
PROT SERPL-MCNC: 7.7 G/DL
PROT SERPL-MCNC: 7.7 G/DL

## 2021-12-28 ENCOUNTER — APPOINTMENT (OUTPATIENT)
Dept: ULTRASOUND IMAGING | Facility: CLINIC | Age: 86
End: 2021-12-28
Payer: MEDICARE

## 2021-12-28 ENCOUNTER — OUTPATIENT (OUTPATIENT)
Dept: OUTPATIENT SERVICES | Facility: HOSPITAL | Age: 86
LOS: 1 days | End: 2021-12-28
Payer: MEDICARE

## 2021-12-28 DIAGNOSIS — Z98.89 OTHER SPECIFIED POSTPROCEDURAL STATES: Chronic | ICD-10-CM

## 2021-12-28 DIAGNOSIS — N18.5 CHRONIC KIDNEY DISEASE, STAGE 5: ICD-10-CM

## 2021-12-28 PROCEDURE — 76775 US EXAM ABDO BACK WALL LIM: CPT | Mod: 26

## 2021-12-28 PROCEDURE — 76775 US EXAM ABDO BACK WALL LIM: CPT

## 2021-12-29 DIAGNOSIS — R19.7 DIARRHEA, UNSPECIFIED: ICD-10-CM

## 2022-01-04 ENCOUNTER — APPOINTMENT (OUTPATIENT)
Dept: NEPHROLOGY | Facility: CLINIC | Age: 87
End: 2022-01-04
Payer: MEDICARE

## 2022-01-04 VITALS — DIASTOLIC BLOOD PRESSURE: 82 MMHG | SYSTOLIC BLOOD PRESSURE: 140 MMHG

## 2022-01-04 VITALS — HEIGHT: 68 IN | BODY MASS INDEX: 29.7 KG/M2 | TEMPERATURE: 97.3 F | WEIGHT: 196 LBS

## 2022-01-04 PROCEDURE — 99213 OFFICE O/P EST LOW 20 MIN: CPT

## 2022-01-04 NOTE — HISTORY OF PRESENT ILLNESS
[FreeTextEntry1] : Nephew Abel and Health Care proxy is present during this visit. The patient is feeling OK but admits decrease appetite and occasional nausea.  The patient has decided that he would rather die than have dialysis. He states that he has little enjoyment and fulfillment in his life.  He however is willing to be educated about the choices he has in dealing with [facility].

## 2022-01-04 NOTE — REVIEW OF SYSTEMS
[Feeling Tired] : feeling tired [Loss Of Hearing] : hearing loss [Palpitations] : no palpitations [Cough] : cough [SOB on Exertion] : shortness of breath during exertion [Nocturia] : nocturia [Itching] : itching [Negative] : Psychiatric

## 2022-01-04 NOTE — ASSESSMENT
[FreeTextEntry1] : 1. Approaching [facility]. Referred to the [facility]. Discussed purpose and goals of this program. Discussed that he has choice to opt in or out of hemodialysis. In the latter case we will proceed w/ conservative care. Made aware that today's decision is not irrevocable and he can change his mind in the future. \par 2. Hypertension: current BP in target. No change in management. \par PLAN: will do CMP, iPTH and Phosphorus and call the nephew in the morning with the results and plan future appointment.

## 2022-01-04 NOTE — PHYSICAL EXAM
[General Appearance - Alert] : alert [General Appearance - In No Acute Distress] : in no acute distress [Sclera] : the sclera and conjunctiva were normal [Jugular Venous Distention Increased] : there was no jugular-venous distention [Auscultation Breath Sounds / Voice Sounds] : lungs were clear to auscultation bilaterally [Heart Sounds] : normal S1 and S2 [Systolic grade ___/6] : A grade [unfilled]/6 systolic murmur was heard. [Full Pulse] : the pedal pulses are present [Pitting Edema] : pitting edema present [___ +] : bilateral [unfilled]+ pitting edema to the ankles [Abdomen Tenderness] : non-tender [] : no hepato-splenomegaly [No Spinal Tenderness] : no spinal tenderness [FreeTextEntry1] : Scratch marks w/ crust in both lower extremitis [No Focal Deficits] : no focal deficits [Oriented To Time, Place, And Person] : oriented to person, place, and time

## 2022-01-04 NOTE — REASON FOR VISIT
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

## 2022-01-05 LAB
ALBUMIN SERPL ELPH-MCNC: 4.4 G/DL
ALP BLD-CCNC: 114 U/L
ALT SERPL-CCNC: 15 U/L
ANION GAP SERPL CALC-SCNC: 19 MMOL/L
AST SERPL-CCNC: 12 U/L
BILIRUB SERPL-MCNC: 0.4 MG/DL
BUN SERPL-MCNC: 51 MG/DL
CALCIUM SERPL-MCNC: 9.2 MG/DL
CALCIUM SERPL-MCNC: 9.2 MG/DL
CHLORIDE SERPL-SCNC: 106 MMOL/L
CO2 SERPL-SCNC: 16 MMOL/L
CREAT SERPL-MCNC: 4.38 MG/DL
GLUCOSE SERPL-MCNC: 100 MG/DL
PARATHYROID HORMONE INTACT: 110 PG/ML
PHOSPHATE SERPL-MCNC: 4.6 MG/DL
POTASSIUM SERPL-SCNC: 4.8 MMOL/L
PROT SERPL-MCNC: 7.6 G/DL
SODIUM SERPL-SCNC: 142 MMOL/L

## 2022-01-31 ENCOUNTER — RX RENEWAL (OUTPATIENT)
Age: 87
End: 2022-01-31

## 2022-02-03 ENCOUNTER — APPOINTMENT (OUTPATIENT)
Dept: DERMATOLOGY | Facility: CLINIC | Age: 87
End: 2022-02-03

## 2022-02-07 ENCOUNTER — APPOINTMENT (OUTPATIENT)
Dept: NEPHROLOGY | Facility: CLINIC | Age: 87
End: 2022-02-07
Payer: MEDICARE

## 2022-02-07 VITALS
SYSTOLIC BLOOD PRESSURE: 158 MMHG | DIASTOLIC BLOOD PRESSURE: 78 MMHG | HEIGHT: 68 IN | OXYGEN SATURATION: 93 % | HEART RATE: 84 BPM | TEMPERATURE: 97.7 F

## 2022-02-07 PROCEDURE — 99213 OFFICE O/P EST LOW 20 MIN: CPT

## 2022-02-07 RX ORDER — AZELASTINE HYDROCHLORIDE 137 UG/1
0.1 SPRAY, METERED NASAL
Qty: 1 | Refills: 1 | Status: DISCONTINUED | COMMUNITY
Start: 2022-01-31 | End: 2022-02-07

## 2022-02-08 LAB
ALBUMIN SERPL ELPH-MCNC: 4.5 G/DL
ALP BLD-CCNC: 108 U/L
ALT SERPL-CCNC: 26 U/L
ANION GAP SERPL CALC-SCNC: 17 MMOL/L
AST SERPL-CCNC: 19 U/L
BASOPHILS # BLD AUTO: 0.06 K/UL
BASOPHILS NFR BLD AUTO: 0.7 %
BILIRUB SERPL-MCNC: 0.3 MG/DL
BUN SERPL-MCNC: 43 MG/DL
CALCIUM SERPL-MCNC: 9.6 MG/DL
CALCIUM SERPL-MCNC: 9.6 MG/DL
CHLORIDE SERPL-SCNC: 103 MMOL/L
CO2 SERPL-SCNC: 22 MMOL/L
CREAT SERPL-MCNC: 4.38 MG/DL
EOSINOPHIL # BLD AUTO: 0.59 K/UL
EOSINOPHIL NFR BLD AUTO: 6.8 %
GLUCOSE SERPL-MCNC: 146 MG/DL
HCT VFR BLD CALC: 41.4 %
HGB BLD-MCNC: 12.9 G/DL
IMM GRANULOCYTES NFR BLD AUTO: 1.2 %
LYMPHOCYTES # BLD AUTO: 1.26 K/UL
LYMPHOCYTES NFR BLD AUTO: 14.5 %
MAN DIFF?: NORMAL
MCHC RBC-ENTMCNC: 28.6 PG
MCHC RBC-ENTMCNC: 31.2 GM/DL
MCV RBC AUTO: 91.8 FL
MONOCYTES # BLD AUTO: 1.01 K/UL
MONOCYTES NFR BLD AUTO: 11.6 %
NEUTROPHILS # BLD AUTO: 5.67 K/UL
NEUTROPHILS NFR BLD AUTO: 65.2 %
PARATHYROID HORMONE INTACT: 313 PG/ML
PHOSPHATE SERPL-MCNC: 3.7 MG/DL
PLATELET # BLD AUTO: 230 K/UL
POTASSIUM SERPL-SCNC: 4 MMOL/L
PROT SERPL-MCNC: 8.1 G/DL
RBC # BLD: 4.51 M/UL
RBC # FLD: 13.5 %
SODIUM SERPL-SCNC: 143 MMOL/L
WBC # FLD AUTO: 8.69 K/UL

## 2022-02-08 RX ORDER — CHLORTHALIDONE 25 MG/1
25 TABLET ORAL DAILY
Qty: 30 | Refills: 6 | Status: DISCONTINUED | COMMUNITY
Start: 2022-01-06 | End: 2022-02-08

## 2022-02-08 RX ORDER — DONEPEZIL HYDROCHLORIDE 5 MG/1
5 TABLET ORAL
Refills: 0 | Status: DISCONTINUED | COMMUNITY
End: 2022-02-08

## 2022-02-08 RX ORDER — FINASTERIDE 5 MG/1
5 TABLET, FILM COATED ORAL
Qty: 90 | Refills: 1 | Status: DISCONTINUED | COMMUNITY
Start: 2021-08-11 | End: 2022-02-08

## 2022-02-08 RX ORDER — FLUTICASONE PROPIONATE 50 UG/1
50 SPRAY, METERED NASAL
Refills: 0 | Status: DISCONTINUED | COMMUNITY
End: 2022-02-08

## 2022-02-08 RX ORDER — LOPERAMIDE HYDROCHLORIDE 2 MG/1
2 CAPSULE ORAL TWICE DAILY
Qty: 180 | Refills: 3 | Status: DISCONTINUED | COMMUNITY
Start: 2021-09-21 | End: 2022-02-08

## 2022-02-08 RX ORDER — AZELASTINE HYDROCHLORIDE 137 UG/1
137 SPRAY, METERED NASAL
Qty: 1 | Refills: 1 | Status: DISCONTINUED | COMMUNITY
Start: 2021-07-13 | End: 2022-02-08

## 2022-02-08 NOTE — REVIEW OF SYSTEMS
[Feeling Poorly] : feeling poorly [Feeling Tired] : feeling tired [Loss Of Hearing] : hearing loss [SOB on Exertion] : shortness of breath during exertion [Nocturia] : nocturia [Limb Swelling] : limb swelling [Itching] : itching [Limb Weakness] : limb weakness [Depression] : depression [Negative] : Gastrointestinal [FreeTextEntry4] : Refuses hearing aids

## 2022-02-08 NOTE — REASON FOR VISIT
[Follow-Up] : a follow-up visit [Family Member] : family member [FreeTextEntry1] : Follow up for stage V CKD, conservative care.

## 2022-02-08 NOTE — PHYSICAL EXAM
[General Appearance - Alert] : alert [Hearing Loss Right Only] : diminished [Hearing Loss Left Only] : dimished [Jugular Venous Distention Increased] : there was no jugular-venous distention [Decreased Breath Sounds] : decreased breath sounds [Bibasilar Rales/Crackles] : bibasilar rales [Heart Sounds] : normal S1 and S2 [Heart Sounds S4] : An S4 was heard [Systolic grade ___/6] : A grade [unfilled]/6 systolic murmur was heard. [Pitting Edema] : pitting edema present [___ +] : bilateral [unfilled]+ pitting edema to the ankles [No Focal Deficits] : no focal deficits [FreeTextEntry1] : Depressed, cries easily when remembering happy events in his life.

## 2022-02-08 NOTE — HISTORY OF PRESENT ILLNESS
[FreeTextEntry1] : The patient is not taking any of the prescribed medications except for Amlodipine.  His major issue is itching that is interfere w/ his sleep. The patient has several scratch marks on his lower extremity. He states that he feels weak and he is not joining the other patient of the facility for lunch or dinner.  He is short of breath and has difficulty waling to the bathroom and back. . He appears depressed. \par The purpose of this visit is to discuss a regimen that may improve the patient quality of life.

## 2022-02-08 NOTE — ASSESSMENT
[FreeTextEntry1] : 1. Itching. Likely related to end stage kidney disease. Will check phosphorus and iPTH. The patient has an appointment with a dermatologist in the near future. Will start w/ low dose Atarax. \par 2. CKD V. Check labs today.\par 3. Hypertension\par 4. Edema with lower extremity swelling and bilateral rales. The patient exertional dyspnea is related to fluid overload. Started on Torsemide 10 mg twice per day. Goal is at least a 10 lb weigh loss over the next 2 weeks. \par Return in one month.

## 2022-02-09 NOTE — REASON FOR VISIT
[FreeTextEntry1] : Depressed not taking Chlorthalidone. Rales and edema, Torsemide 10 bid. Atarax for itching at night.

## 2022-02-25 ENCOUNTER — APPOINTMENT (OUTPATIENT)
Dept: NEPHROLOGY | Facility: CLINIC | Age: 87
End: 2022-02-25
Payer: MEDICARE

## 2022-02-25 VITALS
TEMPERATURE: 97.7 F | WEIGHT: 196 LBS | OXYGEN SATURATION: 95 % | BODY MASS INDEX: 29.7 KG/M2 | HEART RATE: 90 BPM | SYSTOLIC BLOOD PRESSURE: 124 MMHG | DIASTOLIC BLOOD PRESSURE: 74 MMHG | HEIGHT: 68 IN

## 2022-02-25 DIAGNOSIS — F41.8 OTHER SPECIFIED ANXIETY DISORDERS: ICD-10-CM

## 2022-02-25 PROCEDURE — 99213 OFFICE O/P EST LOW 20 MIN: CPT

## 2022-02-26 PROBLEM — F41.8 DEPRESSION WITH ANXIETY: Status: ACTIVE | Noted: 2022-02-26

## 2022-02-26 NOTE — PHYSICAL EXAM
[General Appearance - Alert] : alert [Hearing Loss Right Only] : diminished [Hearing Loss Left Only] : dimished [Jugular Venous Distention Increased] : there was no jugular-venous distention [Bibasilar Rales/Crackles] : bibasilar rales [Heart Sounds] : normal S1 and S2 [Heart Sounds S4] : An S4 was heard [Systolic grade ___/6] : A grade [unfilled]/6 systolic murmur was heard. [Full Pulse] : the pedal pulses are present [Pitting Edema] : pitting edema present [___ +] : bilateral [unfilled]+ pitting edema to the ankles [Abdomen Tenderness] : non-tender [] : no hepato-splenomegaly [No Spinal Tenderness] : no spinal tenderness [No Focal Deficits] : no focal deficits [FreeTextEntry1] : Depressed, cries easily when remembering happy events in his life.

## 2022-02-26 NOTE — REASON FOR VISIT
[Follow-Up] : a follow-up visit [Family Member] : family member [FreeTextEntry1] : Stage V CKD. Patient has elected the conservative choice: no dialysis.

## 2022-02-26 NOTE — REVIEW OF SYSTEMS
[Feeling Tired] : feeling tired [Loss Of Hearing] : hearing loss [Lower Ext Edema] : lower extremity edema [SOB on Exertion] : shortness of breath during exertion [Nocturia] : nocturia [As Noted in HPI] : as noted in HPI [Confused] : confusion [Limb Weakness] : limb weakness [Depression] : depression [Negative] : Gastrointestinal [FreeTextEntry8] : Had an episode of incontinence at night. [de-identified] : Itching is better.

## 2022-02-26 NOTE — HISTORY OF PRESENT ILLNESS
[FreeTextEntry1] : Since the last visit, and the increase in dose of Torsemide, the patient is doing slightly better. His major issues continue to be pain in the right knee, weakness especially of his legs, inability to get up if he falls.  He still does not socialize at the place where he lives. Most of his meals are in his room. He complaints that the two young males who prepare the meals are incompetent.  A physical therapist goes up to his room, but the patient also feels that he is also incompetent.  He states that he has no pleasure in his daily life. His comment are ironic, he laughs but he hides an underlying sadness.

## 2022-02-28 ENCOUNTER — APPOINTMENT (OUTPATIENT)
Dept: ORTHOPEDIC SURGERY | Facility: CLINIC | Age: 87
End: 2022-02-28
Payer: MEDICARE

## 2022-02-28 VITALS — HEIGHT: 65 IN | WEIGHT: 187 LBS | BODY MASS INDEX: 31.16 KG/M2

## 2022-02-28 DIAGNOSIS — Z96.651 PRESENCE OF RIGHT ARTIFICIAL KNEE JOINT: ICD-10-CM

## 2022-02-28 DIAGNOSIS — M11.261 OTHER CHONDROCALCINOSIS, RIGHT KNEE: ICD-10-CM

## 2022-02-28 DIAGNOSIS — M17.11 UNILATERAL PRIMARY OSTEOARTHRITIS, RIGHT KNEE: ICD-10-CM

## 2022-02-28 PROCEDURE — 73562 X-RAY EXAM OF KNEE 3: CPT | Mod: RT

## 2022-02-28 PROCEDURE — 73564 X-RAY EXAM KNEE 4 OR MORE: CPT | Mod: LT

## 2022-02-28 PROCEDURE — 20610 DRAIN/INJ JOINT/BURSA W/O US: CPT | Mod: RT

## 2022-02-28 PROCEDURE — 99213 OFFICE O/P EST LOW 20 MIN: CPT | Mod: 25

## 2022-03-02 ENCOUNTER — APPOINTMENT (OUTPATIENT)
Dept: ORTHOPEDIC SURGERY | Facility: CLINIC | Age: 87
End: 2022-03-02
Payer: MEDICARE

## 2022-03-02 VITALS — BODY MASS INDEX: 31.16 KG/M2 | HEIGHT: 65 IN | WEIGHT: 187 LBS

## 2022-03-02 PROCEDURE — 73080 X-RAY EXAM OF ELBOW: CPT | Mod: RT

## 2022-03-02 PROCEDURE — 73030 X-RAY EXAM OF SHOULDER: CPT | Mod: RT

## 2022-03-02 PROCEDURE — 99214 OFFICE O/P EST MOD 30 MIN: CPT

## 2022-03-02 NOTE — PHYSICAL EXAM
[de-identified] : General Exam\par \par Well developed, well nourished\par No apparent distress\par Oriented to person, place, and time\par Mood: Normal\par Affect: Normal\par Balance and coordination: Normal\par Gait: Normal\par \par Right shoulder exam\par \par Inspection: No swelling, ecchymosis or gross deformity.\par Skin: No masses, No lesions\par Tenderness: No bicipital tenderness, no tenderness to the greater tuberosity/RTC insertion, no anterior shoulder/lesser tuberosity tenderness. No tenderness SC joint, clavicle, AC joint.\par ROM: 160/60/T6\par Impingement tests: Positive Morales\par AC Joint: no pain with cross arm testing\par Biceps: Negative speed\par Strength: 5/5 abduction, external rotation, and internal rotation\par Neuro: AIN, PIN, Ulnar nerve motor intact\par Sensation: Intact to light touch in radial, median, ulnar, and axillary nerve distributions\par Vasc: 2+ radial pulse\par Right elbow exam\par \par ·	Skin: Clean, dry, intact. No ecchymosis. No swelling. No palpable joint effusion. No gross deformity.\par ·	Tenderness: No tenderness to palpation medial epicondyle, + ttp lateral epicondyle, no ttp olecranon, radial head.\par ·	ROM: 0 to 140 degrees full supination/pronation. ROM is pain free.\par ·	Stability: Stable to vaus/valgus stress\par ·	Neuro: Negative tinels at ulnar canal, AIN/PIN/Ulnar nerve in tact to motor/sensation.\par ·	Strength: 5/5 elbow flexion, 5/5 elbow extension, 5/5 supination, 5/5 pronation\par ·	Sensation: In tact to light touch throughout\par ·	Vasc: 2+ radial pulse, <2s cap refill\par \par  [de-identified] : \par The following radiographs were ordered and read by me during this patients visit. I reviewed each radiograph in detail with the patient and discussed the findings as highlighted below. \par 3 views right shoulder 3 views right elbow were obtained today.  There is minimal degenerative changes joint spaces well-maintained.  There is normal alignment no fracture

## 2022-03-02 NOTE — DISCUSSION/SUMMARY
[de-identified] : Right elbow lateral epicondylitis right shoulder impingement.  Given prescription for Voltaren gel.  Physical therapy.  Follow-up as needed.  All questions answered

## 2022-03-02 NOTE — HISTORY OF PRESENT ILLNESS
[de-identified] : 86 y.o M presents to the office complaining of years of chronic R sided shoulder and R elbow pain. Complains of pain and difficulty with overhead activity. Denies traumatic OLE. Denies previous history of injury to either shoulder. Denies mechanical symptoms. Complains of radiating pain from shoulder to R elbow. Denies NT. Here today to discuss treatment options for involved injury. \par \par The patient's past medical history, past surgical history, medications, allergies, and social history were reviewed by me today with the patient and documented accordingly. In addition, the patient's family history, which is noncontributory to this visit, was also reviewed.\par

## 2022-03-04 NOTE — PROCEDURE
[de-identified] : Right knee \par Discussed at length with the patient the planned steroid and lidocaine injection. The risks, benefits, convalescence and alternatives were reviewed. The possible side effects discussed included but were not limited to: pain, swelling, heat and redness. There symptoms are generally mild but if they are extensive then contact the office. Giving pain relievers by mouth such as NSAID’s or Tylenol can generally treat the reactions to  steroid and lidocaine. Rare cases of infection have been noted. Rash, hives and itching may occur post injection. If you have muscle pain or cramps, flushing and or swelling of the face, rapid heart beat, nausea, dizziness, fever, chills, headache, difficulty breathing, swelling in the arms or legs, or have a prickly feeling of your skin, contact a health care provider immediately.\par \par Following this discussion, the knee was prepped with betadine and under sterile conditions 9 cc of 1% lidocanine and 40 mg of depromedrol was injected with a 21 guage needle. The needle was introduced into the joint, aspiration was performed to ensure intra-articular placement and the medication was injected. Upon withdrawal of the needle the site was cleaned with alcohol and a bandaid applied. The patient tolerated the injection well and there were no adverse effects. Post injection instructions included no strenuous activity for 24 hours, cryotherapy and if there are any adverse effects to contact the office.\par

## 2022-03-04 NOTE — PHYSICAL EXAM
[de-identified] : _____ Knee\par Inspection: no effusion or erythema.\par Wounds: none. \par Alignment: normal.\par Palpation: no specific tenderness on palpation.\par ROM: Active (in degrees): \par Ligamentous laxity (neg): all ligaments appear stable, negative ant. drawer test, negative post. drawer test, stable to varus stress test, stable to valgus stress test, negative Lachman's test, negative pivot shift test.\par Meniscal test: negative Nicole's, negative Jared.\par Patellofemoral Alignment Test: Q-angle-, normal.\par Muscle Test: good quad strength.\par Leg examination: calf is soft and non-tender. [de-identified] : Right knee Four views of the knee including AP, Arthur, Lateral and Merchant Views taken in the office today demonstrate medial joint space narrowing with degenerative changes primarily of the medial femoral  tibial compartment, sclerosis, patella at appropriate height, central position, osteophytes Impression: Degenerative arthritis with severe patellofemroal arthritis , Kellgren Enrike grade \par \par Left knee Three radiographic views taken in the office today, AP, Lateral and Merchant views  show a total knee replacement, components in good position and alignment, no evidence of loosening, patella at appropriate height and central position

## 2022-03-04 NOTE — END OF VISIT
[FreeTextEntry3] : This note was written by Vianca Ng on 02/28/22 acting as scribe for Dr. Carson Lombardi M.D.\par \par I, Dr. Carson Lombardi, have read and attest that all the information, medical decision making and discharge instructions within are true and accurate.

## 2022-03-11 ENCOUNTER — INPATIENT (INPATIENT)
Facility: HOSPITAL | Age: 87
LOS: 6 days | Discharge: HOME HEALTH | DRG: 682 | End: 2022-03-18
Attending: HOSPITALIST | Admitting: HOSPITALIST
Payer: MEDICARE

## 2022-03-11 VITALS
DIASTOLIC BLOOD PRESSURE: 82 MMHG | SYSTOLIC BLOOD PRESSURE: 130 MMHG | TEMPERATURE: 98 F | HEIGHT: 67 IN | WEIGHT: 190.04 LBS | OXYGEN SATURATION: 95 % | RESPIRATION RATE: 17 BRPM | HEART RATE: 87 BPM

## 2022-03-11 DIAGNOSIS — R06.02 SHORTNESS OF BREATH: ICD-10-CM

## 2022-03-11 DIAGNOSIS — Z98.89 OTHER SPECIFIED POSTPROCEDURAL STATES: Chronic | ICD-10-CM

## 2022-03-11 DIAGNOSIS — N18.5 CHRONIC KIDNEY DISEASE, STAGE 5: ICD-10-CM

## 2022-03-11 DIAGNOSIS — Z29.9 ENCOUNTER FOR PROPHYLACTIC MEASURES, UNSPECIFIED: ICD-10-CM

## 2022-03-11 DIAGNOSIS — I10 ESSENTIAL (PRIMARY) HYPERTENSION: ICD-10-CM

## 2022-03-11 DIAGNOSIS — Z87.438 PERSONAL HISTORY OF OTHER DISEASES OF MALE GENITAL ORGANS: ICD-10-CM

## 2022-03-11 LAB
ALBUMIN SERPL ELPH-MCNC: 4.3 G/DL — SIGNIFICANT CHANGE UP (ref 3.3–5)
ALP SERPL-CCNC: 82 U/L — SIGNIFICANT CHANGE UP (ref 40–120)
ALT FLD-CCNC: 42 U/L — SIGNIFICANT CHANGE UP (ref 10–45)
ANION GAP SERPL CALC-SCNC: 15 MMOL/L — SIGNIFICANT CHANGE UP (ref 5–17)
APPEARANCE UR: CLEAR — SIGNIFICANT CHANGE UP
AST SERPL-CCNC: 20 U/L — SIGNIFICANT CHANGE UP (ref 10–40)
BASOPHILS # BLD AUTO: 0.03 K/UL — SIGNIFICANT CHANGE UP (ref 0–0.2)
BASOPHILS NFR BLD AUTO: 0.3 % — SIGNIFICANT CHANGE UP (ref 0–2)
BILIRUB SERPL-MCNC: 0.4 MG/DL — SIGNIFICANT CHANGE UP (ref 0.2–1.2)
BILIRUB UR-MCNC: NEGATIVE — SIGNIFICANT CHANGE UP
BUN SERPL-MCNC: 61 MG/DL — HIGH (ref 7–23)
CALCIUM SERPL-MCNC: 9.6 MG/DL — SIGNIFICANT CHANGE UP (ref 8.4–10.5)
CHLORIDE SERPL-SCNC: 103 MMOL/L — SIGNIFICANT CHANGE UP (ref 96–108)
CO2 SERPL-SCNC: 27 MMOL/L — SIGNIFICANT CHANGE UP (ref 22–31)
COLOR SPEC: SIGNIFICANT CHANGE UP
CREAT ?TM UR-MCNC: 80 MG/DL — SIGNIFICANT CHANGE UP
CREAT SERPL-MCNC: 4.62 MG/DL — HIGH (ref 0.5–1.3)
DIFF PNL FLD: ABNORMAL
EGFR: 12 ML/MIN/1.73M2 — LOW
EOSINOPHIL # BLD AUTO: 0.48 K/UL — SIGNIFICANT CHANGE UP (ref 0–0.5)
EOSINOPHIL NFR BLD AUTO: 5.5 % — SIGNIFICANT CHANGE UP (ref 0–6)
GAS PNL BLDV: SIGNIFICANT CHANGE UP
GLUCOSE SERPL-MCNC: 162 MG/DL — HIGH (ref 70–99)
GLUCOSE UR QL: ABNORMAL
HCT VFR BLD CALC: 40 % — SIGNIFICANT CHANGE UP (ref 39–50)
HGB BLD-MCNC: 12.8 G/DL — LOW (ref 13–17)
IMM GRANULOCYTES NFR BLD AUTO: 1.8 % — HIGH (ref 0–1.5)
KETONES UR-MCNC: NEGATIVE — SIGNIFICANT CHANGE UP
LEUKOCYTE ESTERASE UR-ACNC: ABNORMAL
LYMPHOCYTES # BLD AUTO: 1.35 K/UL — SIGNIFICANT CHANGE UP (ref 1–3.3)
LYMPHOCYTES # BLD AUTO: 15.4 % — SIGNIFICANT CHANGE UP (ref 13–44)
MAGNESIUM SERPL-MCNC: 2.3 MG/DL — SIGNIFICANT CHANGE UP (ref 1.6–2.6)
MCHC RBC-ENTMCNC: 29.3 PG — SIGNIFICANT CHANGE UP (ref 27–34)
MCHC RBC-ENTMCNC: 32 GM/DL — SIGNIFICANT CHANGE UP (ref 32–36)
MCV RBC AUTO: 91.5 FL — SIGNIFICANT CHANGE UP (ref 80–100)
MONOCYTES # BLD AUTO: 1 K/UL — HIGH (ref 0–0.9)
MONOCYTES NFR BLD AUTO: 11.4 % — SIGNIFICANT CHANGE UP (ref 2–14)
NEUTROPHILS # BLD AUTO: 5.75 K/UL — SIGNIFICANT CHANGE UP (ref 1.8–7.4)
NEUTROPHILS NFR BLD AUTO: 65.6 % — SIGNIFICANT CHANGE UP (ref 43–77)
NITRITE UR-MCNC: POSITIVE
NRBC # BLD: 0 /100 WBCS — SIGNIFICANT CHANGE UP (ref 0–0)
NT-PROBNP SERPL-SCNC: 991 PG/ML — HIGH (ref 0–300)
OSMOLALITY SERPL: 319 MOSMOL/KG — HIGH (ref 280–301)
OSMOLALITY UR: 385 MOS/KG — SIGNIFICANT CHANGE UP (ref 300–900)
PH UR: 6 — SIGNIFICANT CHANGE UP (ref 5–8)
PHOSPHATE SERPL-MCNC: 4.7 MG/DL — HIGH (ref 2.5–4.5)
PLATELET # BLD AUTO: 188 K/UL — SIGNIFICANT CHANGE UP (ref 150–400)
POTASSIUM SERPL-MCNC: 4 MMOL/L — SIGNIFICANT CHANGE UP (ref 3.5–5.3)
POTASSIUM SERPL-SCNC: 4 MMOL/L — SIGNIFICANT CHANGE UP (ref 3.5–5.3)
POTASSIUM UR-SCNC: 27 MMOL/L — SIGNIFICANT CHANGE UP
PROT SERPL-MCNC: 7.7 G/DL — SIGNIFICANT CHANGE UP (ref 6–8.3)
PROT UR-MCNC: ABNORMAL
RBC # BLD: 4.37 M/UL — SIGNIFICANT CHANGE UP (ref 4.2–5.8)
RBC # FLD: 13.2 % — SIGNIFICANT CHANGE UP (ref 10.3–14.5)
SARS-COV-2 RNA SPEC QL NAA+PROBE: SIGNIFICANT CHANGE UP
SODIUM SERPL-SCNC: 145 MMOL/L — SIGNIFICANT CHANGE UP (ref 135–145)
SODIUM UR-SCNC: 72 MMOL/L — SIGNIFICANT CHANGE UP
SP GR SPEC: 1.01 — SIGNIFICANT CHANGE UP (ref 1.01–1.02)
TROPONIN T, HIGH SENSITIVITY RESULT: 71 NG/L — HIGH (ref 0–51)
UROBILINOGEN FLD QL: NEGATIVE — SIGNIFICANT CHANGE UP
WBC # BLD: 8.77 K/UL — SIGNIFICANT CHANGE UP (ref 3.8–10.5)
WBC # FLD AUTO: 8.77 K/UL — SIGNIFICANT CHANGE UP (ref 3.8–10.5)

## 2022-03-11 PROCEDURE — 99223 1ST HOSP IP/OBS HIGH 75: CPT

## 2022-03-11 PROCEDURE — 99285 EMERGENCY DEPT VISIT HI MDM: CPT

## 2022-03-11 PROCEDURE — 99497 ADVNCD CARE PLAN 30 MIN: CPT | Mod: 25

## 2022-03-11 PROCEDURE — 71045 X-RAY EXAM CHEST 1 VIEW: CPT | Mod: 26

## 2022-03-11 RX ORDER — ONDANSETRON 8 MG/1
4 TABLET, FILM COATED ORAL EVERY 8 HOURS
Refills: 0 | Status: DISCONTINUED | OUTPATIENT
Start: 2022-03-11 | End: 2022-03-18

## 2022-03-11 RX ORDER — AZELASTINE 137 UG/1
2 SPRAY, METERED NASAL
Qty: 0 | Refills: 0 | DISCHARGE

## 2022-03-11 RX ORDER — TERBINAFINE HYDROCHLORIDE 1 G/100G
1 CREAM TOPICAL
Qty: 0 | Refills: 0 | DISCHARGE

## 2022-03-11 RX ORDER — ACETAMINOPHEN 500 MG
650 TABLET ORAL EVERY 6 HOURS
Refills: 0 | Status: DISCONTINUED | OUTPATIENT
Start: 2022-03-11 | End: 2022-03-18

## 2022-03-11 RX ORDER — AMLODIPINE BESYLATE 2.5 MG/1
5 TABLET ORAL DAILY
Refills: 0 | Status: DISCONTINUED | OUTPATIENT
Start: 2022-03-11 | End: 2022-03-18

## 2022-03-11 RX ORDER — LANOLIN ALCOHOL/MO/W.PET/CERES
3 CREAM (GRAM) TOPICAL AT BEDTIME
Refills: 0 | Status: DISCONTINUED | OUTPATIENT
Start: 2022-03-11 | End: 2022-03-18

## 2022-03-11 RX ORDER — AMLODIPINE BESYLATE 2.5 MG/1
1 TABLET ORAL
Qty: 0 | Refills: 0 | DISCHARGE

## 2022-03-11 RX ORDER — HEPARIN SODIUM 5000 [USP'U]/ML
5000 INJECTION INTRAVENOUS; SUBCUTANEOUS EVERY 12 HOURS
Refills: 0 | Status: DISCONTINUED | OUTPATIENT
Start: 2022-03-11 | End: 2022-03-18

## 2022-03-11 RX ORDER — TAMSULOSIN HYDROCHLORIDE 0.4 MG/1
0.4 CAPSULE ORAL AT BEDTIME
Refills: 0 | Status: DISCONTINUED | OUTPATIENT
Start: 2022-03-11 | End: 2022-03-12

## 2022-03-11 RX ORDER — LOPERAMIDE HCL 2 MG
2 TABLET ORAL EVERY 6 HOURS
Refills: 0 | Status: DISCONTINUED | OUTPATIENT
Start: 2022-03-11 | End: 2022-03-18

## 2022-03-11 RX ORDER — BUMETANIDE 0.25 MG/ML
2 INJECTION INTRAMUSCULAR; INTRAVENOUS ONCE
Refills: 0 | Status: COMPLETED | OUTPATIENT
Start: 2022-03-11 | End: 2022-03-11

## 2022-03-11 NOTE — H&P ADULT - ASSESSMENT
86 year old male with a PMHx of HTN, BPH, and CKD Stage V (deferring dialysis) presenting with volume overload secondary to renal dysfunction.

## 2022-03-11 NOTE — CONSULT NOTE ADULT - SUBJECTIVE AND OBJECTIVE BOX
[facility] DIVISION OF KIDNEY DISEASES AND HYPERTENSION -- [phone]  -- INITIAL CONSULT NOTE  --------------------------------------------------------------------------------  HPI: 86-year-old male with PMH of CKD stage 5 presents to ER with worsening SOB. Pt. states he felt JUAREZ and was unable to lay down flat and hence had to come to ER. Pt. follows Op Nephrologist Dr. Mccord and was recently seen in Feb 2022. Pt. with advanced CKD and has prior refused to initiate HD. Most recent Scr on OP labs was elevated at 4.38 on 02/7/22. Scr on admission elevated at 4.6 and BUN of 60. UA done on admission showed hematuria and proteinuria. Pt. currently on Torsemide 30 mg PO for LE swelling.    Pt. seen and examined in ER, son at bedside. Pt. complaining of mild SOB and LE swelling.    PAST HISTORY  --------------------------------------------------------------------------------  PAST MEDICAL & SURGICAL HISTORY:  HTN (hypertension)    History of BPH    History of nephrolithiasis    S/P TKR (total knee replacement)  LEFT    H/O hernia repair  RIGHT    S/P TURP    History of laparoscopic cholecystectomy      FAMILY HISTORY:  No pertinent family history in first degree relatives      PAST SOCIAL HISTORY:    ALLERGIES & MEDICATIONS  --------------------------------------------------------------------------------  Allergies    No Known Allergies    Intolerances      Standing Inpatient Medications    PRN Inpatient Medications      REVIEW OF SYSTEMS  --------------------------------------------------------------------------------  Gen: No fevers/chills  Skin: No rashes  Head/Eyes/Ears: Normal hearing,   Respiratory: SOB, as per HPI  CV: No chest pain  GI: No abdominal pain, diarrhea  : No dysuria, hematuria, as per HPI  MSK: LE edema  Heme: No easy bruising or bleeding    All other systems were reviewed and are negative, except as noted.    VITALS/PHYSICAL EXAM  --------------------------------------------------------------------------------  T(C): 36.6 (03-11-22 @ 16:15), Max: 36.9 (03-11-22 @ 15:51)  HR: 78 (03-11-22 @ 16:15) (78 - 87)  BP: 143/79 (03-11-22 @ 16:15) (130/82 - 143/79)  RR: 17 (03-11-22 @ 16:15) (17 - 17)  SpO2: 95% (03-11-22 @ 16:15) (95% - 95%)  Wt(kg): --  Height (cm): 170.2 (03-11-22 @ 15:51)  Weight (kg): 86.2 (03-11-22 @ 15:51)  BMI (kg/m2): 29.8 (03-11-22 @ 15:51)  BSA (m2): 1.98 (03-11-22 @ 15:51)      Physical Exam:  	Gen: elderly obese man, NAD  	HEENT: Anicteric  	Pulm: fair effort, faint rales bilaterally  	CV: S1S2+  	Abd: Soft, +B, obese abdomen  	Ext: LE edema B/L+  	Neuro: Awake  	Skin: Warm and dry    LABS/STUDIES  --------------------------------------------------------------------------------              12.8   8.77  >-----------<  188      [03-11-22 @ 16:54]              40.0     145  |  103  |  61  ----------------------------<  162      [03-11-22 @ 16:54]  4.0   |  27  |  4.62        Ca     9.6     [03-11-22 @ 16:54]      Mg     2.3     [03-11-22 @ 16:54]      Phos  4.7     [03-11-22 @ 16:54]    Serum Osmolality 319      [03-11-22 @ 17:46]    Creatinine Trend:  SCr 4.62 [03-11 @ 16:54]    Urinalysis - [03-11-22 @ 18:58]      Color Light Yellow / Appearance Clear / SG 1.015 / pH 6.0      Gluc Trace / Ketone Negative  / Bili Negative / Urobili Negative       Blood Small / Protein 100 mg/dL / Leuk Est Moderate / Nitrite Positive      RBC 4 / WBC 29 / Hyaline 1 / Gran  / Sq Epi  / Non Sq Epi 0 / Bacteria Few    Urine Osmolality 385      [03-11-22 @ 18:58]

## 2022-03-11 NOTE — H&P ADULT - PROBLEM SELECTOR PLAN 1
See by nephrology fellow in the ED, appreciate recommendations  - pt previously declined to initiate hemodialysis; volume status management with diuretics  - f/u urine lytes, urine urea, and spot urine TP/CR  - Bumex 2mg IV x1   - bladder scan  - strict I/O  - avoid nephrotoxins; dose meds per eGFR See by nephrology fellow in the ED, appreciate recommendations  - pt declines hemodialysis; will manage volume status with diuretics  - pt takes torsemide 20mg qAM and 10mg qPM  - f/u urine lytes, urine urea, and spot urine TP/CR  - Bumex 2mg IV x1 now  - bladder scan  - obtain RUQ ultrasound to assess for ascites  - strict I/O  - avoid nephrotoxins; dose meds per eGFR See by nephrology fellow in the ED, appreciate recommendations  - pt declines hemodialysis; will manage volume status with diuretics  - pt takes torsemide 20mg qAM and 10mg qPM  - f/u urine lytes, urine urea, and spot urine TP/CR  - U/A results noted-- patient has no symptoms of UTI; will hold off on antibiotics  - Bumex 2mg IV x1 now  - bladder scan  - obtain RUQ ultrasound to assess for ascites  - strict I/O  - avoid nephrotoxins; dose meds per eGFR

## 2022-03-11 NOTE — ED PROVIDER NOTE - OBJECTIVE STATEMENT
87yo M h.o HTN, Anxiety/Depression, BPH, CKD Stage V (deferring dialysis) presenting to ED with worsening SOB and orthopnea over the past few weeks, now to the point that he is dyspneic constantly at rest. Recently had Lasix increased outpatient with improved LE swelling however worsening dyspnea. Reports good PO intake, denies urinary symptoms, denies BMs.

## 2022-03-11 NOTE — ED ADULT NURSE NOTE - OBJECTIVE STATEMENT
86y Male presents to the ED with difficulty sleeping at night, SOB, and cough. Pts nephew, at bedside states the pt who resides at the [facility] in [place] has been seeing a nephrologist for months now for stage five kidney disease. Pt was sent in to the hospital today for fluid overload, as per nephrologists orders. Pt denies dialysis at this time, on inspection no edema noted in extremities. Pts nephew states the pt has been recently tired, SOB, and coughing. Pt denies, CP, fevers, chills, numbness, tingling, dysuria, and headaches. PMH includes HTN. Pt is A&Ox3. Pt safety is maintained, bed in lowest position, wheels locked, and side rails raised.

## 2022-03-11 NOTE — H&P ADULT - NSHPPOAPULMEMBOLUS_GEN_A_CORE
Message   Same dose   5mg MWF and Sun   2 5mg all other days   Recheck in 1 week  Verified Results  (1) PT WITH INR [id number] 06:39AM Prince Cummings   REPORT COMMENT:  FASTING:NO     Test Name Result Flag Reference   INR 2 2 H    Reference Range                     0 9-1 1  Moderate-intensity Warfarin Therapy 2 0-3 0  Higher-intensity Warfarin Therapy   3 0-4 0   PT 23 9 sec H 9 0-11 5   For more information on this test, go to:  [email or web address] no

## 2022-03-11 NOTE — ED PROVIDER NOTE - PHYSICAL EXAMINATION
GENERAL: non-toxic appearing, alert, in NAD  HEENT: atraumatic, normocephalic, Vision grossly intact, no conjunctivitis or discharge, hearing grossly intact,  no nasal discharge, epistaxis   CARDIAC: RRR, normal S1S2,  no appreciable murmurs, no cyanosis, cap refill < 2 seconds  PULM: tachypneic with normal respiratory effort, oxygen saturation on 95% on RA, diffuse crackles  GI: abdomen large, firm and distended, non-tender with positive fluid wave  NEURO: awake and alert, follows commands, normal speech, PERRLA, EOMI, no focal motor or sensory deficits  MSK: spine appears normal, no joint swelling or erythema, ranging all extremities with no appreciable loss of ROM  EXT: 1+ pedal edema to shins with out calf tenderness, redness or swelling  SKIN: warm, dry, and intact, no rashes  PSYCH: appropriate mood and affect

## 2022-03-11 NOTE — CONSULT NOTE ADULT - PROBLEM SELECTOR RECOMMENDATION 9
Pt. with advanced CKD stage 5 and fluid overload. Pt. follows Op Nephrologist Dr. Mccord and was recently seen in Feb 2022. Pt. with advanced CKD and has prior refused to initiate HD. Most recent Scr on OP labs was elevated at 4.38 on 02/7/22. Scr on admission elevated at 4.6 and BUN of 60. UA done on admission showed hematuria and proteinuria. Pt. currently on Torsemide 30 mg PO for LE swelling. CXR s/o pulmonary vascular congestion. Recommend check urine electrolytes, urine urea and spot urine TP/CR. Recommend Bumex 2 mg once IV tonight. Pt with hx of  BPH, recommend bladder scan to monitor for urinary retention. Strict I.O. Monitor labs and urine output. Avoid nephrotoxins. Dose medications as per eGFR.    Discussed with on call attending, Dr. Flores. I have personally seen and examined this patient.  I have fully participated in the care of this patient. I have reviewed all pertinent clinical information, including history, physical exam, plan and the Resident’s note and agree except as noted.

## 2022-03-11 NOTE — ED ADULT NURSE NOTE - NSIMPLEMENTINTERV_GEN_ALL_ED
Implemented All Fall with Harm Risk Interventions:  [place] to call system. Call bell, personal items and telephone within reach. Instruct patient to call for assistance. Room bathroom lighting operational. Non-slip footwear when patient is off stretcher. Physically safe environment: no spills, clutter or unnecessary equipment. Stretcher in lowest position, wheels locked, appropriate side rails in place. Provide visual cue, wrist band, yellow gown, etc. Monitor gait and stability. Monitor for mental status changes and reorient to person, place, and time. Review medications for side effects contributing to fall risk. Reinforce activity limits and safety measures with patient and family. Provide visual clues: red socks.

## 2022-03-11 NOTE — H&P ADULT - NSICDXPASTMEDICALHX_GEN_ALL_CORE_FT
PAST MEDICAL HISTORY:  History of BPH     History of nephrolithiasis     HTN (hypertension)     Stage 5 chronic kidney disease not on chronic dialysis      appropriate

## 2022-03-11 NOTE — H&P ADULT - NSHPPHYSICALEXAM_GEN_ALL_CORE
Vital Signs Last 24 Hrs  T(C): 36.7 (11 Mar 2022 21:15), Max: 36.9 (11 Mar 2022 15:51)  T(F): 98 (11 Mar 2022 21:15), Max: 98.5 (11 Mar 2022 15:51)  HR: 77 (11 Mar 2022 21:15) (77 - 87)  BP: 154/71 (11 Mar 2022 21:15) (130/82 - 154/71)  BP(mean): 99 (11 Mar 2022 21:15) (99 - 100)  RR: 17 (11 Mar 2022 21:15) (17 - 17)  SpO2: 95% (11 Mar 2022 21:15) (95% - 95%)

## 2022-03-11 NOTE — ED PROVIDER NOTE - NSICDXPASTSURGICALHX_GEN_ALL_CORE_FT
PAST SURGICAL HISTORY:  H/O hernia repair RIGHT    History of laparoscopic cholecystectomy     S/P TKR (total knee replacement) LEFT    S/P TURP

## 2022-03-11 NOTE — H&P ADULT - NSHPSOCIALHISTORY_GEN_ALL_CORE
Remote smoking history. No alcohol use. Lives in assisted living ([place]). Walks with walker assistance.

## 2022-03-11 NOTE — ED PROVIDER NOTE - CLINICAL SUMMARY MEDICAL DECISION MAKING FREE TEXT BOX
87yo M with Stage V renal disease deferring dialysis now with worsening SOB, orthopnea. HD stable, not hypoxic, non-labored breathing, with mild peripheral edema. Pt seems to be responding to outpatient diuretics as LE edema improved however with large distended abdomen, concern for possible ascites poss causing SOB. CXR to assess for pulm congestion, will likely need to be admitted for diuresis and work up of new ascitis if present.

## 2022-03-11 NOTE — H&P ADULT - HISTORY OF PRESENT ILLNESS
86 year old male with a PMHx of HTN, Anxiety/Depression, BPH, and CKD Stage V (deferring dialysis) presenting with          Vitals: afebrile, HR 78, /79, SpO2 95% on room air.  Labs: CBC unremarkable. CMP shows BUN/Cr of 61/4.62 (Most recent Scr on OP labs was elevated at 4.38 on 02/7/22). Phos elevated to 4.7.   CXR: pulmonary edema vs infection 86 year old male with a PMHx of HTN, BPH, and CKD Stage V (deferring dialysis) presents as per instruction by his nephrologist for volume overload. The patient has a longstanding history of CKD and has consistent deferred dialysis. He ambulation is mainly limited by his history of arthritis of bilateral knees, but he does endorse shortness of breath on exertion walking ground level without associated chest pain. He also describes persistent dry cough, occurring typically at rest when laying in bed. His diuretics outpatient was recently increased; he is currently on a total of 30mg of Torsemide per day (20mg in the morning and 10mg in the evening) and reports good urine output. He denies any dysuria, difficulty initiating urine stream, or abdominal pain. No fevers or chills.    Vitals: afebrile, HR 78, /79, SpO2 95% on room air.  Labs: CBC unremarkable. CMP shows BUN/Cr of 61/4.62 (Most recent Scr on OP labs was elevated at 4.38 on 02/7/22). Phos elevated to 4.7.   CXR: pulmonary edema vs infection

## 2022-03-11 NOTE — H&P ADULT - PROBLEM SELECTOR PLAN 4
- DVT ppx: heparin subq  - GI ppx: none  - Diet: renal, low sodium, fluid restricted  - Code status: DNR/DNI-- confirmed at the time of admission  - HCP: Reilly Chawla (c: [phone]/h: [phone]), gabrielle

## 2022-03-11 NOTE — H&P ADULT - NSHPLABSRESULTS_GEN_ALL_CORE
LABS:                         12.8   8.77  )-----------( 188      ( 11 Mar 2022 16:54 )             40.0     -11    145  |  103  |  61<H>  ----------------------------<  162<H>  4.0   |  27  |  4.62<H>    Ca    9.6      11 Mar 2022 16:54  Phos  4.7       Mg     2.3         TPro  7.7  /  Alb  4.3  /  TBili  0.4  /  DBili  x   /  AST  20  /  ALT  42  /  AlkPhos  82  -      Urinalysis Basic - ( 11 Mar 2022 18:58 )    Color: Light Yellow / Appearance: Clear / S.015 / pH: x  Gluc: x / Ketone: Negative  / Bili: Negative / Urobili: Negative   Blood: x / Protein: 100 mg/dL / Nitrite: Positive   Leuk Esterase: Moderate / RBC: 4 /hpf / WBC 29 /HPF   Sq Epi: x / Non Sq Epi: 0 /hpf / Bacteria: Few          Serum Pro-Brain Natriuretic Peptide: 991 pg/mL ( @ 16:54)      Records reviewed from prior hospitalization.  Labs reviewed remarkable for - CBC unremarkable. CMP shows BUN/Cr of 61/4.62 (Most recent Scr on OP labs was elevated at 4.38 on 22). Phos elevated to 4.7.   EKG personally reviewed - LBBB  CXR personally reviewed - pulmonary edema vs infection

## 2022-03-11 NOTE — H&P ADULT - NEGATIVE GENERAL GENITOURINARY SYMPTOMS
Received notification pantoprazole not covered  Tried PA through [facility] and not permitted since non formulary  Pt is going to check into this further with insurance and/or maybe Good Rx  no hematuria/no dysuria

## 2022-03-11 NOTE — CONSULT NOTE ADULT - ATTENDING COMMENTS
Alert, conversant.  Less SOB but persistent leg swelling  1.  CKD5--adamant about following no dialysis approach.  Med rx to limit cardiopulmonary symptoms but without --> uremic symptoms.  BP control, HCO3>21 may decrease progression  2.  Volume overload--diuretic rx.  Review cardiac w/u and benefit of non interventional rx  discussed with med team

## 2022-03-11 NOTE — H&P ADULT - PARTICIPANTS
DC instructions
Discussed with the patient his advanced directives. He has previously appointed his nephew, Reilly Chawla, his healthcare proxy. He also clear states his code status preference for DNR and DNI. This was previously discussed with family as well./Patient

## 2022-03-11 NOTE — ED PROVIDER NOTE - PROGRESS NOTE DETAILS
Sakshi-[id number]: pt admitted prior to sign out, received call from transplant requesting nephrology consult. LM with [facility] (prior consult) re: consult for hyponatremia, pending recs, pt admitted to hospitalist.

## 2022-03-12 LAB
ALBUMIN, RANDOM URINE: <1.2 MG/DL — SIGNIFICANT CHANGE UP
ALBUMIN/CREATININE RATIO (ACR): SIGNIFICANT CHANGE UP MG/G (ref 0–30)
ANION GAP SERPL CALC-SCNC: 19 MMOL/L — HIGH (ref 5–17)
BUN SERPL-MCNC: 64 MG/DL — HIGH (ref 7–23)
CALCIUM SERPL-MCNC: 9.3 MG/DL — SIGNIFICANT CHANGE UP (ref 8.4–10.5)
CALCIUM UR-MCNC: 3.2 MG/DL — SIGNIFICANT CHANGE UP
CHLORIDE SERPL-SCNC: 101 MMOL/L — SIGNIFICANT CHANGE UP (ref 96–108)
CO2 SERPL-SCNC: 21 MMOL/L — LOW (ref 22–31)
CREAT ?TM UR-MCNC: 76 MG/DL — SIGNIFICANT CHANGE UP
CREAT ?TM UR-MCNC: 80 MG/DL — SIGNIFICANT CHANGE UP
CREAT SERPL-MCNC: 4.72 MG/DL — HIGH (ref 0.5–1.3)
EGFR: 11 ML/MIN/1.73M2 — LOW
GLUCOSE SERPL-MCNC: 214 MG/DL — HIGH (ref 70–99)
HCT VFR BLD CALC: 37.7 % — LOW (ref 39–50)
HGB BLD-MCNC: 12.1 G/DL — LOW (ref 13–17)
MAGNESIUM SERPL-MCNC: 2.1 MG/DL — SIGNIFICANT CHANGE UP (ref 1.6–2.6)
MCHC RBC-ENTMCNC: 29.1 PG — SIGNIFICANT CHANGE UP (ref 27–34)
MCHC RBC-ENTMCNC: 32.1 GM/DL — SIGNIFICANT CHANGE UP (ref 32–36)
MCV RBC AUTO: 90.6 FL — SIGNIFICANT CHANGE UP (ref 80–100)
NRBC # BLD: 0 /100 WBCS — SIGNIFICANT CHANGE UP (ref 0–0)
PHOSPHATE SERPL-MCNC: 4.5 MG/DL — SIGNIFICANT CHANGE UP (ref 2.5–4.5)
PLATELET # BLD AUTO: 174 K/UL — SIGNIFICANT CHANGE UP (ref 150–400)
POTASSIUM SERPL-MCNC: 3.6 MMOL/L — SIGNIFICANT CHANGE UP (ref 3.5–5.3)
POTASSIUM SERPL-SCNC: 3.6 MMOL/L — SIGNIFICANT CHANGE UP (ref 3.5–5.3)
PROT ?TM UR-MCNC: 129 MG/DL — HIGH (ref 0–12)
PROT/CREAT UR-RTO: 1.7 RATIO — HIGH (ref 0–0.2)
RBC # BLD: 4.16 M/UL — LOW (ref 4.2–5.8)
RBC # FLD: 13.2 % — SIGNIFICANT CHANGE UP (ref 10.3–14.5)
SODIUM SERPL-SCNC: 141 MMOL/L — SIGNIFICANT CHANGE UP (ref 135–145)
SODIUM UR-SCNC: 63 MMOL/L — SIGNIFICANT CHANGE UP
UUN UR-MCNC: 502 MG/DL — SIGNIFICANT CHANGE UP
WBC # BLD: 9.6 K/UL — SIGNIFICANT CHANGE UP (ref 3.8–10.5)
WBC # FLD AUTO: 9.6 K/UL — SIGNIFICANT CHANGE UP (ref 3.8–10.5)

## 2022-03-12 PROCEDURE — 99222 1ST HOSP IP/OBS MODERATE 55: CPT | Mod: GC

## 2022-03-12 PROCEDURE — 99233 SBSQ HOSP IP/OBS HIGH 50: CPT

## 2022-03-12 RX ORDER — TAMSULOSIN HYDROCHLORIDE 0.4 MG/1
0.8 CAPSULE ORAL AT BEDTIME
Refills: 0 | Status: DISCONTINUED | OUTPATIENT
Start: 2022-03-12 | End: 2022-03-18

## 2022-03-12 RX ORDER — BUMETANIDE 0.25 MG/ML
1 INJECTION INTRAMUSCULAR; INTRAVENOUS
Refills: 0 | Status: DISCONTINUED | OUTPATIENT
Start: 2022-03-12 | End: 2022-03-14

## 2022-03-12 RX ADMIN — TAMSULOSIN HYDROCHLORIDE 0.8 MILLIGRAM(S): 0.4 CAPSULE ORAL at 21:30

## 2022-03-12 RX ADMIN — HEPARIN SODIUM 5000 UNIT(S): 5000 INJECTION INTRAVENOUS; SUBCUTANEOUS at 19:18

## 2022-03-12 RX ADMIN — TAMSULOSIN HYDROCHLORIDE 0.4 MILLIGRAM(S): 0.4 CAPSULE ORAL at 00:47

## 2022-03-12 RX ADMIN — BUMETANIDE 1 MILLIGRAM(S): 0.25 INJECTION INTRAMUSCULAR; INTRAVENOUS at 19:18

## 2022-03-12 RX ADMIN — HEPARIN SODIUM 5000 UNIT(S): 5000 INJECTION INTRAVENOUS; SUBCUTANEOUS at 05:41

## 2022-03-12 RX ADMIN — BUMETANIDE 2 MILLIGRAM(S): 0.25 INJECTION INTRAMUSCULAR; INTRAVENOUS at 00:43

## 2022-03-12 RX ADMIN — AMLODIPINE BESYLATE 5 MILLIGRAM(S): 2.5 TABLET ORAL at 05:41

## 2022-03-12 NOTE — PROGRESS NOTE ADULT - SUBJECTIVE AND OBJECTIVE BOX
Patient is a 86y old  Male who presents with a chief complaint of volume overload secondary to chronic kidney disease (12 Mar 2022 11:29)        SUBJECTIVE / OVERNIGHT EVENTS: patient reports feeling like he has to pee but not much coming out. he also reports some rachel/sob. no abd pain.       MEDICATIONS  (STANDING):  amLODIPine   Tablet 5 milliGRAM(s) Oral daily  buMETAnide Injectable 1 milliGRAM(s) IV Push two times a day  heparin   Injectable 5000 Unit(s) SubCutaneous every 12 hours  tamsulosin 0.8 milliGRAM(s) Oral at bedtime    MEDICATIONS  (PRN):  acetaminophen     Tablet .. 650 milliGRAM(s) Oral every 6 hours PRN Temp greater or equal to 38C (100.4F), Mild Pain (1 - 3)  aluminum hydroxide/magnesium hydroxide/simethicone Suspension 30 milliLiter(s) Oral every 4 hours PRN Dyspepsia  loperamide 2 milliGRAM(s) Oral every 6 hours PRN Diarrhea  melatonin 3 milliGRAM(s) Oral at bedtime PRN Insomnia  ondansetron Injectable 4 milliGRAM(s) IV Push every 8 hours PRN Nausea and/or Vomiting      Vital Signs Last 24 Hrs  T(C): 36.8 (12 Mar 2022 12:14), Max: 37.2 (11 Mar 2022 23:25)  T(F): 98.3 (12 Mar 2022 12:14), Max: 99 (11 Mar 2022 23:25)  HR: 79 (12 Mar 2022 12:14) (77 - 93)  BP: 127/66 (12 Mar 2022 12:14) (127/66 - 170/72)  BP(mean): 99 (11 Mar 2022 21:15) (99 - 99)  RR: 18 (12 Mar 2022 12:14) (17 - 18)  SpO2: 95% (12 Mar 2022 12:14) (95% - 99%)  CAPILLARY BLOOD GLUCOSE        I&O's Summary    11 Mar 2022 07:01  -  12 Mar 2022 07:00  --------------------------------------------------------  IN: 390 mL / OUT: 400 mL / NET: -10 mL    12 Mar 2022 06:01  -  12 Mar 2022 19:21  --------------------------------------------------------  IN: 600 mL / OUT: 350 mL / NET: 250 mL          PHYSICAL EXAM:   GENERAL: NAD, well-developed  HEAD:  Atraumatic, Normocephalic  EYES:  conjunctiva and sclera clear  NECK: Supple   CHEST/LUNG: Crackles more in bases.   HEART: S1S2 normal. Regular rate and rhythm; No murmurs, rubs, or gallops  ABDOMEN: Soft, Nontender, Nondistended; Bowel sounds present  EXTREMITIES:  Trace LE edema  PSYCH/Neuro: Awake and answers questions and follows commands.   SKIN: No rashes or lesions      LABS:                        12.1   9.60  )-----------( 174      ( 12 Mar 2022 07:18 )             37.7     -    141  |  101  |  64<H>  ----------------------------<  214<H>  3.6   |  21<L>  |  4.72<H>    Ca    9.3      12 Mar 2022 07:18  Phos  4.5       Mg     2.1         TPro  7.7  /  Alb  4.3  /  TBili  0.4  /  DBili  x   /  AST  20  /  ALT  42  /  AlkPhos  82  03-11          Urinalysis Basic - ( 11 Mar 2022 18:58 )    Color: Light Yellow / Appearance: Clear / S.015 / pH: x  Gluc: x / Ketone: Negative  / Bili: Negative / Urobili: Negative   Blood: x / Protein: 100 mg/dL / Nitrite: Positive   Leuk Esterase: Moderate / RBC: 4 /hpf / WBC 29 /HPF   Sq Epi: x / Non Sq Epi: 0 /hpf / Bacteria: Few      < from: Xray Chest 1 View AP/PA (22 @ 16:56) >  IMPRESSION:  Bilateral patchy opacities may represent pulmonary edema versus infection.  Cardiomegaly.    --- End of Report ---    < end of copied text >      RADIOLOGY & ADDITIONAL TESTS:    Imaging Personally Reviewed: cxr report.   Consultant(s) Notes Reviewed:  renal  Care Discussed with Consultants/Other Providers: Dr. Flores

## 2022-03-12 NOTE — PATIENT PROFILE ADULT - FALL HARM RISK - DEVICES
None
I will START or STAY ON the medications listed below when I get home from the hospital:    ibuprofen 600 mg oral tablet  -- 1 tab(s) by mouth every 6 hours, As needed, Mild pain or headache  -- Indication: For for pain    ferrous sulfate 325 mg oral tablet  -- 1 tab(s) by mouth 2 times a day  -- Check with your doctor before becoming pregnant.  Do not chew, break, or crush.  May discolor urine or feces.    -- Indication: For mild anemia    Prenatal Multivitamins with Folic Acid 1 mg oral capsule  -- 1 cap(s) by mouth once a day  -- Indication: For continue daily    Colace sodium 100 mg oral capsule  -- 1 cap(s) by mouth 2 times a day  -- Medication should be taken with plenty of water.    -- Indication: For Stool softener    Dulcolax Laxative 5 mg oral delayed release tablet  -- 1 tab(s) by mouth once a day, As Needed - for constipation  -- Swallow whole.  Do not crush.    -- Indication: For take if constipated    Vitamin D3 400 intl units oral capsule  -- 1 cap(s) by mouth once a day  -- Indication: For continue as taken at home

## 2022-03-12 NOTE — PATIENT PROFILE ADULT - VISION (WITH CORRECTIVE LENSES IF THE PATIENT USUALLY WEARS THEM):
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

## 2022-03-12 NOTE — PROGRESS NOTE ADULT - PROBLEM SELECTOR PLAN 4
- DVT ppx: heparin subq  - GI ppx: none  - Diet: renal, dash  - Code status: DNR/DNI-- confirmed at the time of admission  - HCP: Reilly Chawla (c: [phone]/h: [phone]), nephew  -PT eval.    5. Discussed plan with Dr. Flores and CHRISTINA Mabry and RASTA.

## 2022-03-12 NOTE — PROGRESS NOTE ADULT - PROBLEM SELECTOR PLAN 1
See by nephrology fellow in the ED, appreciate recommendations  - pt declines hemodialysis; will manage volume status with diuretics  - pt takes torsemide 20mg qAM and 10mg qPM. - holding now.   - f/u urine lytes, urine urea, and spot urine TP/CR  - U/A results noted-- patient has no symptoms of UTI; will hold off on antibiotics. -F/u UCx.   -s/p Bumex 2mg IV x1 yesterday. -Will start Bumex 1mg IV BID for now.   - bladder scan q8h.   -will increase home flomax to 0.8mg qhs to help reduce any component due to ?urinary retention.  - obtain abdominal ultrasound to assess for ascites and any renal/uro structural issues.   - strict I/O's  - avoid nephrotoxins; dose meds per eGFR  -renal recs appreciated.   -Will get echocardiogram.  -stool studies in any diarrhea.  -monitor bmp, mg, phos, vbg.

## 2022-03-12 NOTE — PATIENT PROFILE ADULT - FALL HARM RISK - HARM RISK INTERVENTIONS
Assistance with ambulation/Assistance OOB with selected safe patient handling equipment/Communicate Risk of Fall with Harm to all staff/Monitor for mental status changes/Monitor gait and stability/Reinforce activity limits and safety measures with patient and family/Tailored Fall Risk Interventions/Use of alarms - bed, chair and/or voice tab/Visual Cue: Yellow wristband and red socks/Bed in lowest position, wheels locked, appropriate side rails in place/Call bell, personal items and telephone in reach/Instruct patient to call for assistance before getting out of bed or chair/Non-slip footwear when patient is out of bed/[place] to call system/Physically safe environment - no spills, clutter or unnecessary equipment/Purposeful Proactive Rounding/Room/bathroom lighting operational, light cord in reach

## 2022-03-13 LAB
A1C WITH ESTIMATED AVERAGE GLUCOSE RESULT: 7.8 % — HIGH (ref 4–5.6)
ANION GAP SERPL CALC-SCNC: 18 MMOL/L — HIGH (ref 5–17)
APTT BLD: 30.9 SEC — SIGNIFICANT CHANGE UP (ref 27.5–35.5)
BUN SERPL-MCNC: 74 MG/DL — HIGH (ref 7–23)
CALCIUM SERPL-MCNC: 8.9 MG/DL — SIGNIFICANT CHANGE UP (ref 8.4–10.5)
CHLORIDE SERPL-SCNC: 102 MMOL/L — SIGNIFICANT CHANGE UP (ref 96–108)
CO2 SERPL-SCNC: 20 MMOL/L — LOW (ref 22–31)
CREAT SERPL-MCNC: 4.74 MG/DL — HIGH (ref 0.5–1.3)
EGFR: 11 ML/MIN/1.73M2 — LOW
ESTIMATED AVERAGE GLUCOSE: 177 MG/DL — HIGH (ref 68–114)
GAS PNL BLDV: SIGNIFICANT CHANGE UP
GLUCOSE BLDC GLUCOMTR-MCNC: 149 MG/DL — HIGH (ref 70–99)
GLUCOSE BLDC GLUCOMTR-MCNC: 169 MG/DL — HIGH (ref 70–99)
GLUCOSE SERPL-MCNC: 144 MG/DL — HIGH (ref 70–99)
INR BLD: 1.07 RATIO — SIGNIFICANT CHANGE UP (ref 0.88–1.16)
MAGNESIUM SERPL-MCNC: 2.2 MG/DL — SIGNIFICANT CHANGE UP (ref 1.6–2.6)
PHOSPHATE SERPL-MCNC: 4.9 MG/DL — HIGH (ref 2.5–4.5)
POTASSIUM SERPL-MCNC: 3.5 MMOL/L — SIGNIFICANT CHANGE UP (ref 3.5–5.3)
POTASSIUM SERPL-SCNC: 3.5 MMOL/L — SIGNIFICANT CHANGE UP (ref 3.5–5.3)
PROT ?TM UR-MCNC: 133 MG/DL — HIGH (ref 0–12)
PROTHROM AB SERPL-ACNC: 12.3 SEC — SIGNIFICANT CHANGE UP (ref 10.5–13.4)
SODIUM SERPL-SCNC: 140 MMOL/L — SIGNIFICANT CHANGE UP (ref 135–145)
URATE SERPL-MCNC: 11.5 MG/DL — HIGH (ref 3.4–8.8)

## 2022-03-13 PROCEDURE — 99232 SBSQ HOSP IP/OBS MODERATE 35: CPT

## 2022-03-13 RX ORDER — SODIUM CHLORIDE 9 MG/ML
1000 INJECTION, SOLUTION INTRAVENOUS
Refills: 0 | Status: DISCONTINUED | OUTPATIENT
Start: 2022-03-13 | End: 2022-03-18

## 2022-03-13 RX ORDER — DEXTROSE 50 % IN WATER 50 %
25 SYRINGE (ML) INTRAVENOUS ONCE
Refills: 0 | Status: DISCONTINUED | OUTPATIENT
Start: 2022-03-13 | End: 2022-03-18

## 2022-03-13 RX ORDER — INSULIN LISPRO 100/ML
VIAL (ML) SUBCUTANEOUS
Refills: 0 | Status: DISCONTINUED | OUTPATIENT
Start: 2022-03-13 | End: 2022-03-18

## 2022-03-13 RX ORDER — GLUCAGON INJECTION, SOLUTION 0.5 MG/.1ML
1 INJECTION, SOLUTION SUBCUTANEOUS ONCE
Refills: 0 | Status: DISCONTINUED | OUTPATIENT
Start: 2022-03-13 | End: 2022-03-18

## 2022-03-13 RX ORDER — INSULIN LISPRO 100/ML
VIAL (ML) SUBCUTANEOUS AT BEDTIME
Refills: 0 | Status: DISCONTINUED | OUTPATIENT
Start: 2022-03-13 | End: 2022-03-18

## 2022-03-13 RX ORDER — DEXTROSE 50 % IN WATER 50 %
15 SYRINGE (ML) INTRAVENOUS ONCE
Refills: 0 | Status: DISCONTINUED | OUTPATIENT
Start: 2022-03-13 | End: 2022-03-18

## 2022-03-13 RX ORDER — DEXTROSE 50 % IN WATER 50 %
12.5 SYRINGE (ML) INTRAVENOUS ONCE
Refills: 0 | Status: DISCONTINUED | OUTPATIENT
Start: 2022-03-13 | End: 2022-03-18

## 2022-03-13 RX ADMIN — HEPARIN SODIUM 5000 UNIT(S): 5000 INJECTION INTRAVENOUS; SUBCUTANEOUS at 05:24

## 2022-03-13 RX ADMIN — HEPARIN SODIUM 5000 UNIT(S): 5000 INJECTION INTRAVENOUS; SUBCUTANEOUS at 18:12

## 2022-03-13 RX ADMIN — BUMETANIDE 1 MILLIGRAM(S): 0.25 INJECTION INTRAMUSCULAR; INTRAVENOUS at 18:12

## 2022-03-13 RX ADMIN — AMLODIPINE BESYLATE 5 MILLIGRAM(S): 2.5 TABLET ORAL at 05:24

## 2022-03-13 RX ADMIN — TAMSULOSIN HYDROCHLORIDE 0.8 MILLIGRAM(S): 0.4 CAPSULE ORAL at 21:30

## 2022-03-13 RX ADMIN — BUMETANIDE 1 MILLIGRAM(S): 0.25 INJECTION INTRAMUSCULAR; INTRAVENOUS at 05:24

## 2022-03-13 NOTE — PROGRESS NOTE ADULT - SUBJECTIVE AND OBJECTIVE BOX
Patient is a 86y old  Male who presents with a chief complaint of volume overload secondary to chronic kidney disease (13 Mar 2022 09:57)        SUBJECTIVE / OVERNIGHT EVENTS: patient says he feels a little better.       MEDICATIONS  (STANDING):  amLODIPine   Tablet 5 milliGRAM(s) Oral daily  buMETAnide Injectable 1 milliGRAM(s) IV Push two times a day  heparin   Injectable 5000 Unit(s) SubCutaneous every 12 hours  tamsulosin 0.8 milliGRAM(s) Oral at bedtime    MEDICATIONS  (PRN):  acetaminophen     Tablet .. 650 milliGRAM(s) Oral every 6 hours PRN Temp greater or equal to 38C (100.4F), Mild Pain (1 - 3)  aluminum hydroxide/magnesium hydroxide/simethicone Suspension 30 milliLiter(s) Oral every 4 hours PRN Dyspepsia  loperamide 2 milliGRAM(s) Oral every 6 hours PRN Diarrhea  melatonin 3 milliGRAM(s) Oral at bedtime PRN Insomnia  ondansetron Injectable 4 milliGRAM(s) IV Push every 8 hours PRN Nausea and/or Vomiting      Vital Signs Last 24 Hrs  T(C): 36.4 (13 Mar 2022 11:20), Max: 36.8 (12 Mar 2022 20:24)  T(F): 97.6 (13 Mar 2022 11:20), Max: 98.3 (12 Mar 2022 20:24)  HR: 83 (13 Mar 2022 11:20) (68 - 83)  BP: 133/67 (13 Mar 2022 11:20) (112/60 - 152/68)  BP(mean): --  RR: 18 (13 Mar 2022 11:20) (18 - 18)  SpO2: 95% (13 Mar 2022 11:20) (92% - 95%)  CAPILLARY BLOOD GLUCOSE        I&O's Summary    12 Mar 2022 06:01  -  13 Mar 2022 07:00  --------------------------------------------------------  IN: 600 mL / OUT: 1190 mL / NET: -590 mL    13 Mar 2022 07:01  -  13 Mar 2022 15:08  --------------------------------------------------------  IN: 480 mL / OUT: 300 mL / NET: 180 mL          PHYSICAL EXAM:   GENERAL: NAD, well-developed  HEAD:  Atraumatic, Normocephalic  EYES:  conjunctiva and sclera clear  NECK: Supple   CHEST/LUNG: reduced bs in bases. No crackles  HEART: S1S2 normal. Regular rate and rhythm; No murmurs, rubs, or gallops  ABDOMEN: Soft, Nontender, Nondistended; Bowel sounds present  EXTREMITIES:  trace le edema  PSYCH/Neuro: awake and answers questions and follows commands.    SKIN: No rashes or lesions      LABS:                        12.1   9.60  )-----------( 174      ( 12 Mar 2022 07:18 )             37.7     03-13    140  |  102  |  74<H>  ----------------------------<  144<H>  3.5   |  20<L>  |  4.74<H>    Ca    8.9      13 Mar 2022 07:04  Phos  4.9     03-13  Mg     2.2     03-13    TPro  7.7  /  Alb  4.3  /  TBili  0.4  /  DBili  x   /  AST  20  /  ALT  42  /  AlkPhos  82  03-11    PT/INR - ( 13 Mar 2022 07:04 )   PT: 12.3 sec;   INR: 1.07 ratio         PTT - ( 13 Mar 2022 07:04 )  PTT:30.9 sec      Urinalysis Basic - ( 11 Mar 2022 18:58 )    Color: Light Yellow / Appearance: Clear / S.015 / pH: x  Gluc: x / Ketone: Negative  / Bili: Negative / Urobili: Negative   Blood: x / Protein: 100 mg/dL / Nitrite: Positive   Leuk Esterase: Moderate / RBC: 4 /hpf / WBC 29 /HPF   Sq Epi: x / Non Sq Epi: 0 /hpf / Bacteria: Few          RADIOLOGY & ADDITIONAL TESTS:    Imaging Personally Reviewed:  Consultant(s) Notes Reviewed:    Care Discussed with Consultants/Other Providers:

## 2022-03-13 NOTE — PROGRESS NOTE ADULT - PROBLEM SELECTOR PLAN 4
- DVT ppx: heparin subq  - GI ppx: none  - Diet: renal, dash  - Code status: DNR/DNI-- confirmed at the time of admission  - HCP: Reilly Chawla (c: [phone]/h: [phone]), nephew  -PT eval.    5. Discussed plan with ANGY Brown.

## 2022-03-13 NOTE — PROGRESS NOTE ADULT - PROBLEM SELECTOR PLAN 1
See by nephrology fellow in the ED, appreciate recommendations  - pt declines hemodialysis; will manage volume status with diuretics  - pt takes torsemide 20mg qAM and 10mg qPM. - holding now.   - f/u urine lytes, urine urea, and spot urine TP/CR  - U/A results noted-- patient has no symptoms of UTI; will hold off on antibiotics. -F/u UCx.   -s/p Bumex 2mg IV x1 on admission. -Will c/w Bumex 1mg IV BID for now.   - bladder scan q8h.   -c/w increased home flomax to 0.8mg qhs to help reduce any component due to ?urinary retention.  - obtain abdominal ultrasound to assess for ascites and any renal/uro structural issues.   - strict I/O's  - avoid nephrotoxins; dose meds per eGFR  -renal recs appreciated.   -Will get echocardiogram. -BNP mildly elevated.  -stool studies in any diarrhea.  -monitor bmp, mg, phos, vbg.

## 2022-03-13 NOTE — PHYSICAL THERAPY INITIAL EVALUATION ADULT - ADDITIONAL COMMENTS
Pt lives in [facility] facility. Ambulates with rollator walker. In last number of weeks was not able to walk to meals, was having meals in apartment. Showers independently. Has help with medications.

## 2022-03-14 LAB
ANION GAP SERPL CALC-SCNC: 16 MMOL/L — SIGNIFICANT CHANGE UP (ref 5–17)
BUN SERPL-MCNC: 74 MG/DL — HIGH (ref 7–23)
CALCIUM SERPL-MCNC: 9.1 MG/DL — SIGNIFICANT CHANGE UP (ref 8.4–10.5)
CHLORIDE SERPL-SCNC: 102 MMOL/L — SIGNIFICANT CHANGE UP (ref 96–108)
CO2 SERPL-SCNC: 21 MMOL/L — LOW (ref 22–31)
CREAT SERPL-MCNC: 5.21 MG/DL — HIGH (ref 0.5–1.3)
CULTURE RESULTS: SIGNIFICANT CHANGE UP
CULTURE RESULTS: SIGNIFICANT CHANGE UP
EGFR: 10 ML/MIN/1.73M2 — LOW
GLUCOSE BLDC GLUCOMTR-MCNC: 114 MG/DL — HIGH (ref 70–99)
GLUCOSE BLDC GLUCOMTR-MCNC: 156 MG/DL — HIGH (ref 70–99)
GLUCOSE BLDC GLUCOMTR-MCNC: 212 MG/DL — HIGH (ref 70–99)
GLUCOSE BLDC GLUCOMTR-MCNC: 215 MG/DL — HIGH (ref 70–99)
GLUCOSE SERPL-MCNC: 181 MG/DL — HIGH (ref 70–99)
HCT VFR BLD CALC: 39.8 % — SIGNIFICANT CHANGE UP (ref 39–50)
HGB BLD-MCNC: 12.7 G/DL — LOW (ref 13–17)
MAGNESIUM SERPL-MCNC: 2.3 MG/DL — SIGNIFICANT CHANGE UP (ref 1.6–2.6)
MCHC RBC-ENTMCNC: 28.5 PG — SIGNIFICANT CHANGE UP (ref 27–34)
MCHC RBC-ENTMCNC: 31.9 GM/DL — LOW (ref 32–36)
MCV RBC AUTO: 89.2 FL — SIGNIFICANT CHANGE UP (ref 80–100)
NRBC # BLD: 0 /100 WBCS — SIGNIFICANT CHANGE UP (ref 0–0)
NT-PROBNP SERPL-SCNC: 825 PG/ML — HIGH (ref 0–300)
NT-PROBNP SERPL-SCNC: 869 PG/ML — HIGH (ref 0–300)
PHOSPHATE SERPL-MCNC: 5.5 MG/DL — HIGH (ref 2.5–4.5)
PLATELET # BLD AUTO: 183 K/UL — SIGNIFICANT CHANGE UP (ref 150–400)
POTASSIUM SERPL-MCNC: 3.9 MMOL/L — SIGNIFICANT CHANGE UP (ref 3.5–5.3)
POTASSIUM SERPL-SCNC: 3.9 MMOL/L — SIGNIFICANT CHANGE UP (ref 3.5–5.3)
RBC # BLD: 4.46 M/UL — SIGNIFICANT CHANGE UP (ref 4.2–5.8)
RBC # FLD: 12.7 % — SIGNIFICANT CHANGE UP (ref 10.3–14.5)
SODIUM SERPL-SCNC: 139 MMOL/L — SIGNIFICANT CHANGE UP (ref 135–145)
SPECIMEN SOURCE: SIGNIFICANT CHANGE UP
SPECIMEN SOURCE: SIGNIFICANT CHANGE UP
WBC # BLD: 10.3 K/UL — SIGNIFICANT CHANGE UP (ref 3.8–10.5)
WBC # FLD AUTO: 10.3 K/UL — SIGNIFICANT CHANGE UP (ref 3.8–10.5)

## 2022-03-14 PROCEDURE — 99233 SBSQ HOSP IP/OBS HIGH 50: CPT

## 2022-03-14 PROCEDURE — 99233 SBSQ HOSP IP/OBS HIGH 50: CPT | Mod: GC

## 2022-03-14 PROCEDURE — 76770 US EXAM ABDO BACK WALL COMP: CPT | Mod: 26

## 2022-03-14 PROCEDURE — 71045 X-RAY EXAM CHEST 1 VIEW: CPT | Mod: 26

## 2022-03-14 PROCEDURE — 71250 CT THORAX DX C-: CPT | Mod: 26

## 2022-03-14 RX ORDER — BUMETANIDE 0.25 MG/ML
2 INJECTION INTRAMUSCULAR; INTRAVENOUS
Refills: 0 | Status: DISCONTINUED | OUTPATIENT
Start: 2022-03-14 | End: 2022-03-15

## 2022-03-14 RX ADMIN — HEPARIN SODIUM 5000 UNIT(S): 5000 INJECTION INTRAVENOUS; SUBCUTANEOUS at 05:34

## 2022-03-14 RX ADMIN — Medication 2: at 12:25

## 2022-03-14 RX ADMIN — AMLODIPINE BESYLATE 5 MILLIGRAM(S): 2.5 TABLET ORAL at 05:35

## 2022-03-14 RX ADMIN — BUMETANIDE 2 MILLIGRAM(S): 0.25 INJECTION INTRAMUSCULAR; INTRAVENOUS at 16:05

## 2022-03-14 RX ADMIN — HEPARIN SODIUM 5000 UNIT(S): 5000 INJECTION INTRAVENOUS; SUBCUTANEOUS at 18:02

## 2022-03-14 RX ADMIN — Medication 1: at 09:28

## 2022-03-14 RX ADMIN — BUMETANIDE 1 MILLIGRAM(S): 0.25 INJECTION INTRAMUSCULAR; INTRAVENOUS at 05:34

## 2022-03-14 RX ADMIN — Medication 3 MILLIGRAM(S): at 23:03

## 2022-03-14 RX ADMIN — TAMSULOSIN HYDROCHLORIDE 0.8 MILLIGRAM(S): 0.4 CAPSULE ORAL at 23:03

## 2022-03-14 NOTE — PROGRESS NOTE ADULT - SUBJECTIVE AND OBJECTIVE BOX
Patient is a 86y old  Male who presents with a chief complaint of volume overload secondary to chronic kidney disease (14 Mar 2022 14:31)        SUBJECTIVE / OVERNIGHT EVENTS: patient says his sob is better.       MEDICATIONS  (STANDING):  amLODIPine   Tablet 5 milliGRAM(s) Oral daily  buMETAnide Injectable 2 milliGRAM(s) IV Push two times a day  dextrose 40% Gel 15 Gram(s) Oral once  dextrose 5%. 1000 milliLiter(s) (50 mL/Hr) IV Continuous <Continuous>  dextrose 5%. 1000 milliLiter(s) (100 mL/Hr) IV Continuous <Continuous>  dextrose 50% Injectable 25 Gram(s) IV Push once  dextrose 50% Injectable 12.5 Gram(s) IV Push once  dextrose 50% Injectable 25 Gram(s) IV Push once  glucagon  Injectable 1 milliGRAM(s) IntraMuscular once  heparin   Injectable 5000 Unit(s) SubCutaneous every 12 hours  insulin lispro (ADMELOG) corrective regimen sliding scale   SubCutaneous three times a day before meals  insulin lispro (ADMELOG) corrective regimen sliding scale   SubCutaneous at bedtime  tamsulosin 0.8 milliGRAM(s) Oral at bedtime    MEDICATIONS  (PRN):  acetaminophen     Tablet .. 650 milliGRAM(s) Oral every 6 hours PRN Temp greater or equal to 38C (100.4F), Mild Pain (1 - 3)  aluminum hydroxide/magnesium hydroxide/simethicone Suspension 30 milliLiter(s) Oral every 4 hours PRN Dyspepsia  loperamide 2 milliGRAM(s) Oral every 6 hours PRN Diarrhea  melatonin 3 milliGRAM(s) Oral at bedtime PRN Insomnia  ondansetron Injectable 4 milliGRAM(s) IV Push every 8 hours PRN Nausea and/or Vomiting      Vital Signs Last 24 Hrs  T(C): 36.6 (14 Mar 2022 13:52), Max: 36.7 (14 Mar 2022 04:21)  T(F): 97.9 (14 Mar 2022 13:52), Max: 98.1 (14 Mar 2022 04:21)  HR: 80 (14 Mar 2022 14:56) (80 - 87)  BP: 114/62 (14 Mar 2022 14:56) (111/67 - 146/86)  BP(mean): --  RR: 18 (14 Mar 2022 13:52) (18 - 18)  SpO2: 94% (14 Mar 2022 13:52) (92% - 95%)  CAPILLARY BLOOD GLUCOSE      POCT Blood Glucose.: 212 mg/dL (14 Mar 2022 12:13)  POCT Blood Glucose.: 156 mg/dL (14 Mar 2022 09:25)  POCT Blood Glucose.: 169 mg/dL (13 Mar 2022 21:55)  POCT Blood Glucose.: 149 mg/dL (13 Mar 2022 18:17)    I&O's Summary    13 Mar 2022 07:01  -  14 Mar 2022 07:00  --------------------------------------------------------  IN: 480 mL / OUT: 960 mL / NET: -480 mL    14 Mar 2022 07:01  -  14 Mar 2022 16:07  --------------------------------------------------------  IN: 480 mL / OUT: 400 mL / NET: 80 mL          PHYSICAL EXAM:   GENERAL: NAD,  appears tachypneic  HEAD:  Atraumatic, Normocephalic  EYES:  conjunctiva and sclera clear  NECK: Supple   CHEST/LUNG: distant bs; No wheeze  HEART: S1S2 normal. Regular rate and rhythm; No murmurs, rubs, or gallops  ABDOMEN: Soft, overweight, Nontender, Nondistended; Bowel sounds present  EXTREMITIES:  trace le edema  PSYCH/Neuro: Awake and answers questions and follows commands.   SKIN: No rashes or lesions      LABS:                        12.7   10.30 )-----------( 183      ( 14 Mar 2022 10:11 )             39.8     03-14    139  |  102  |  74<H>  ----------------------------<  181<H>  3.9   |  21<L>  |  5.21<H>    Ca    9.1      14 Mar 2022 10:11  Phos  5.5     03-14  Mg     2.3     03-14      PT/INR - ( 13 Mar 2022 07:04 )   PT: 12.3 sec;   INR: 1.07 ratio         PTT - ( 13 Mar 2022 07:04 )  PTT:30.9 sec        xx< from: Xray Chest 1 View- PORTABLE-Routine (Xray Chest 1 View- PORTABLE-Routine .) (03.14.22 @ 12:16) >  IMPRESSION:  No evidence of CHF        Thank you for the courtesy of this referral.    --- End of Report ---    < end of copied text >      RADIOLOGY & ADDITIONAL TESTS:    Imaging Personally Reviewed: cxr   Consultant(s) Notes Reviewed:   renal  Care Discussed with Consultants/Other Providers: renal fellow

## 2022-03-14 NOTE — PROGRESS NOTE ADULT - PROBLEM SELECTOR PLAN 1
See by nephrology fellow in the ED, appreciate recommendations  - pt declines hemodialysis; will manage volume status with diuretics  - pt takes torsemide 20mg qAM and 10mg qPM. - holding now.   - f/u urine lytes, urine urea, and spot urine TP/CR. -proteinuria noted. F/u renal recs.  - U/A results noted-- patient has no symptoms of UTI; will hold off on antibiotics. -F/u UCx - showed coag negative staph, consider treating if symptoms.   -s/p Bumex 2mg IV x1 on admission. -s/p Bumex 1mg IV BID for now. -per renal recs, increase to 2mg iv bid.   - bladder scan q8h. -strict i's/o's.   -c/w increased home flomax to 0.8mg qhs to help reduce any component due to ?urinary retention.  - obtain abdominal ultrasound to assess for ascites and any renal/uro structural issues. - no hydronephrosis but enlarged prostate.  - strict I/O's  - avoid nephrotoxins; dose meds per eGFR  -renal recs appreciated.   -Will get echocardiogram - pending. -BNP mildly elevated but improved on repeat.  -stool studies in any diarrhea - testing.  -monitor bmp, mg, phos, vbg.  -Cr worse today.  -Repeat CXR seems improved from admission. -Will get CT chest to further eval ?opacities seen on admission CXR. -will check CRP, ESR, PCT.

## 2022-03-14 NOTE — PROGRESS NOTE ADULT - PROBLEM SELECTOR PLAN 4
- DVT ppx: heparin subq  - GI ppx: none  - Diet: renal, dash  - Code status: DNR/DNI-- confirmed at the time of admission  - HCP: Reilly Ovalle (c: [phone]/h: [phone]), nephew  -PT eval recs home PT. -He lives in assisted living.     5. Discussed plan with ANGY Brown and renal fellow.

## 2022-03-14 NOTE — PROGRESS NOTE ADULT - PROBLEM SELECTOR PLAN 1
Pt. with advanced CKD stage 5 and fluid overload. Pt. follows Op Nephrologist Dr. Mccord and was recently seen in Feb 2022. Pt. with advanced CKD and has prior refused to initiate HD. Most recent Scr on OP labs was elevated at 4.38 on 02/7/22. Scr on admission elevated at 4.6 and BUN of 60. UA done on admission showed hematuria and proteinuria. Pt. failed Torsemide 30 mg PO at home. CXR s/o pulmonary vascular congestion.  Recommend Bumex 2 mg IV bid. Pt with hx of  BPH, recommend bladder scan to monitor for urinary retention. Strict I & O. Monitor labs and urine output. Avoid nephrotoxins. Dose medications as per eGFR.    Disccussed with Hospitalist        If you have any questions, please feel free to contact me  Annalisa Bose  Nephrology Fellow  Pager NS: [phone]/ [facility]: [phone]    (After 5 pm or on weekends please page the on-call fellow, can check [email or web address] for schedule. Login is vernell sheffield, schedule under [facility] medicine, psych, derm) Pt. with advanced CKD stage 5 and fluid overload. Pt. follows Op Nephrologist Dr. Mccord and was recently seen in Feb 2022. Pt. with advanced CKD and has prior refused to initiate HD. Most recent Scr on OP labs was elevated at 4.38 on 02/7/22. Scr on admission elevated at 4.6 and BUN of 60. UA done on admission showed hematuria and proteinuria. Pt. failed Torsemide 30 mg PO at home. CXR s/o pulmonary vascular congestion.  Recommend Bumex 2 mg IV bid. Pt with hx of  BPH, recommend bladder scan to monitor for urinary retention. Strict I & O. Monitor labs and urine output. Avoid nephrotoxins. Dose medications as per eGFR.    Discussed with Hospitalist        If you have any questions, please feel free to contact rainer Bose  Nephrology Fellow  Pager NS: [phone]/ [facility]: [phone]    (After 5 pm or on weekends please page the on-call fellow, can check [email or web address] for schedule. Login is vernell sheffield, schedule under [facility] medicine, psych, derm)

## 2022-03-14 NOTE — PROGRESS NOTE ADULT - SUBJECTIVE AND OBJECTIVE BOX
[facility] DIVISION OF KIDNEY DISEASES AND HYPERTENSION -- [phone]  --PROGRESS NOTE  --------------------------------------------------------------------------------      Pt. seen and examined. Appears SOB. CXR done at bedside shows significant pulm vasc congestion. UOP 960cc with some not saved    PAST HISTORY  --------------------------------------------------------------------------------  PAST MEDICAL & SURGICAL HISTORY:  HTN (hypertension)    History of BPH    History of nephrolithiasis    S/P TKR (total knee replacement)  LEFT    H/O hernia repair  RIGHT    S/P TURP    History of laparoscopic cholecystectomy      FAMILY HISTORY:  No pertinent family history in first degree relatives      PAST SOCIAL HISTORY:    ALLERGIES & MEDICATIONS  --------------------------------------------------------------------------------  Allergies    No Known Allergies    Intolerances      Standing Inpatient Medications    PRN Inpatient Medications      REVIEW OF SYSTEMS  --------------------------------------------------------------------------------  Gen: No fevers/chills  Skin: No rashes  Head/Eyes/Ears: Normal hearing,   Respiratory: _SOB  CV: No chest pain  GI: No abdominal pain, diarrhea  : No dysuria, hematuria, as per HPI  MSK: +LE edema  Heme: No easy bruising or bleeding    All other systems were reviewed and are negative, except as noted.    VITALS/PHYSICAL EXAM  --------------------------------------------------------------------------------  T(C): 36.6 (03-11-22 @ 16:15), Max: 36.9 (03-11-22 @ 15:51)  HR: 78 (03-11-22 @ 16:15) (78 - 87)  BP: 143/79 (03-11-22 @ 16:15) (130/82 - 143/79)  RR: 17 (03-11-22 @ 16:15) (17 - 17)  SpO2: 95% (03-11-22 @ 16:15) (95% - 95%)  Wt(kg): --  Height (cm): 170.2 (03-11-22 @ 15:51)  Weight (kg): 86.2 (03-11-22 @ 15:51)  BMI (kg/m2): 29.8 (03-11-22 @ 15:51)  BSA (m2): 1.98 (03-11-22 @ 15:51)      Physical Exam:  	Gen: elderly obese man, appears SOB  	HEENT: Anicteric  	Pulm:  rales bilaterally  	CV: S1S2+  	Abd: Soft, +B, obese abdomen  	Ext: LE edema B/L+  	Neuro: Awake  	Skin: Warm and dry    LABS/STUDIES  --------------------------------------------------------------------------------              12.8   8.77  >-----------<  188      [03-11-22 @ 16:54]              40.0     145  |  103  |  61  ----------------------------<  162      [03-11-22 @ 16:54]  4.0   |  27  |  4.62        Ca     9.6     [03-11-22 @ 16:54]      Mg     2.3     [03-11-22 @ 16:54]      Phos  4.7     [03-11-22 @ 16:54]    Serum Osmolality 319      [03-11-22 @ 17:46]    Creatinine Trend:  SCr 4.62 [03-11 @ 16:54]    Urinalysis - [03-11-22 @ 18:58]      Color Light Yellow / Appearance Clear / SG 1.015 / pH 6.0      Gluc Trace / Ketone Negative  / Bili Negative / Urobili Negative       Blood Small / Protein 100 mg/dL / Leuk Est Moderate / Nitrite Positive      RBC 4 / WBC 29 / Hyaline 1 / Gran  / Sq Epi  / Non Sq Epi 0 / Bacteria Few    Urine Osmolality 385      [03-11-22 @ 18:58]

## 2022-03-15 LAB
ANION GAP SERPL CALC-SCNC: 18 MMOL/L — HIGH (ref 5–17)
BUN SERPL-MCNC: 85 MG/DL — HIGH (ref 7–23)
CALCIUM SERPL-MCNC: 9.1 MG/DL — SIGNIFICANT CHANGE UP (ref 8.4–10.5)
CHLORIDE SERPL-SCNC: 101 MMOL/L — SIGNIFICANT CHANGE UP (ref 96–108)
CO2 SERPL-SCNC: 21 MMOL/L — LOW (ref 22–31)
CREAT SERPL-MCNC: 5.44 MG/DL — HIGH (ref 0.5–1.3)
CRP SERPL-MCNC: 17 MG/L — HIGH (ref 0–4)
EGFR: 10 ML/MIN/1.73M2 — LOW
ERYTHROCYTE [SEDIMENTATION RATE] IN BLOOD: 89 MM/HR — HIGH (ref 0–20)
GLUCOSE BLDC GLUCOMTR-MCNC: 112 MG/DL — HIGH (ref 70–99)
GLUCOSE BLDC GLUCOMTR-MCNC: 130 MG/DL — HIGH (ref 70–99)
GLUCOSE BLDC GLUCOMTR-MCNC: 137 MG/DL — HIGH (ref 70–99)
GLUCOSE BLDC GLUCOMTR-MCNC: 209 MG/DL — HIGH (ref 70–99)
GLUCOSE SERPL-MCNC: 143 MG/DL — HIGH (ref 70–99)
POTASSIUM SERPL-MCNC: 3.8 MMOL/L — SIGNIFICANT CHANGE UP (ref 3.5–5.3)
POTASSIUM SERPL-SCNC: 3.8 MMOL/L — SIGNIFICANT CHANGE UP (ref 3.5–5.3)
PROCALCITONIN SERPL-MCNC: 0.27 NG/ML — HIGH (ref 0.02–0.1)
SODIUM SERPL-SCNC: 140 MMOL/L — SIGNIFICANT CHANGE UP (ref 135–145)

## 2022-03-15 PROCEDURE — 99233 SBSQ HOSP IP/OBS HIGH 50: CPT | Mod: GC

## 2022-03-15 PROCEDURE — 93306 TTE W/DOPPLER COMPLETE: CPT | Mod: 26

## 2022-03-15 PROCEDURE — 99233 SBSQ HOSP IP/OBS HIGH 50: CPT

## 2022-03-15 RX ORDER — CEFTRIAXONE 500 MG/1
1000 INJECTION, POWDER, FOR SOLUTION INTRAMUSCULAR; INTRAVENOUS ONCE
Refills: 0 | Status: COMPLETED | OUTPATIENT
Start: 2022-03-15 | End: 2022-03-15

## 2022-03-15 RX ORDER — BUMETANIDE 0.25 MG/ML
2 INJECTION INTRAMUSCULAR; INTRAVENOUS
Refills: 0 | Status: DISCONTINUED | OUTPATIENT
Start: 2022-03-15 | End: 2022-03-18

## 2022-03-15 RX ORDER — CEFTRIAXONE 500 MG/1
1000 INJECTION, POWDER, FOR SOLUTION INTRAMUSCULAR; INTRAVENOUS EVERY 24 HOURS
Refills: 0 | Status: DISCONTINUED | OUTPATIENT
Start: 2022-03-16 | End: 2022-03-17

## 2022-03-15 RX ORDER — CEFTRIAXONE 500 MG/1
INJECTION, POWDER, FOR SOLUTION INTRAMUSCULAR; INTRAVENOUS
Refills: 0 | Status: DISCONTINUED | OUTPATIENT
Start: 2022-03-15 | End: 2022-03-17

## 2022-03-15 RX ADMIN — Medication 2: at 12:22

## 2022-03-15 RX ADMIN — TAMSULOSIN HYDROCHLORIDE 0.8 MILLIGRAM(S): 0.4 CAPSULE ORAL at 22:21

## 2022-03-15 RX ADMIN — HEPARIN SODIUM 5000 UNIT(S): 5000 INJECTION INTRAVENOUS; SUBCUTANEOUS at 17:50

## 2022-03-15 RX ADMIN — Medication 650 MILLIGRAM(S): at 18:22

## 2022-03-15 RX ADMIN — HEPARIN SODIUM 5000 UNIT(S): 5000 INJECTION INTRAVENOUS; SUBCUTANEOUS at 06:06

## 2022-03-15 RX ADMIN — Medication 650 MILLIGRAM(S): at 17:49

## 2022-03-15 RX ADMIN — BUMETANIDE 2 MILLIGRAM(S): 0.25 INJECTION INTRAMUSCULAR; INTRAVENOUS at 06:06

## 2022-03-15 RX ADMIN — CEFTRIAXONE 100 MILLIGRAM(S): 500 INJECTION, POWDER, FOR SOLUTION INTRAMUSCULAR; INTRAVENOUS at 11:58

## 2022-03-15 RX ADMIN — AMLODIPINE BESYLATE 5 MILLIGRAM(S): 2.5 TABLET ORAL at 06:06

## 2022-03-15 RX ADMIN — BUMETANIDE 2 MILLIGRAM(S): 0.25 INJECTION INTRAMUSCULAR; INTRAVENOUS at 17:49

## 2022-03-15 NOTE — PROGRESS NOTE ADULT - SUBJECTIVE AND OBJECTIVE BOX
Patient is a 86y old  Male who presents with a chief complaint of volume overload secondary to chronic kidney disease (15 Mar 2022 14:36)        SUBJECTIVE / OVERNIGHT EVENTS: patient says his cough and breathing are better. denies any pain. says he is urinating ok.       MEDICATIONS  (STANDING):  amLODIPine   Tablet 5 milliGRAM(s) Oral daily  buMETAnide 2 milliGRAM(s) Oral two times a day  cefTRIAXone   IVPB      dextrose 40% Gel 15 Gram(s) Oral once  dextrose 5%. 1000 milliLiter(s) (50 mL/Hr) IV Continuous <Continuous>  dextrose 5%. 1000 milliLiter(s) (100 mL/Hr) IV Continuous <Continuous>  dextrose 50% Injectable 25 Gram(s) IV Push once  dextrose 50% Injectable 12.5 Gram(s) IV Push once  dextrose 50% Injectable 25 Gram(s) IV Push once  glucagon  Injectable 1 milliGRAM(s) IntraMuscular once  heparin   Injectable 5000 Unit(s) SubCutaneous every 12 hours  insulin lispro (ADMELOG) corrective regimen sliding scale   SubCutaneous three times a day before meals  insulin lispro (ADMELOG) corrective regimen sliding scale   SubCutaneous at bedtime  tamsulosin 0.8 milliGRAM(s) Oral at bedtime    MEDICATIONS  (PRN):  acetaminophen     Tablet .. 650 milliGRAM(s) Oral every 6 hours PRN Temp greater or equal to 38C (100.4F), Mild Pain (1 - 3)  aluminum hydroxide/magnesium hydroxide/simethicone Suspension 30 milliLiter(s) Oral every 4 hours PRN Dyspepsia  loperamide 2 milliGRAM(s) Oral every 6 hours PRN Diarrhea  melatonin 3 milliGRAM(s) Oral at bedtime PRN Insomnia  ondansetron Injectable 4 milliGRAM(s) IV Push every 8 hours PRN Nausea and/or Vomiting      Vital Signs Last 24 Hrs  T(C): 36.4 (15 Mar 2022 13:59), Max: 36.8 (14 Mar 2022 21:21)  T(F): 97.5 (15 Mar 2022 13:59), Max: 98.2 (14 Mar 2022 21:21)  HR: 80 (15 Mar 2022 13:59) (77 - 83)  BP: 145/76 (15 Mar 2022 13:59) (137/76 - 153/78)  BP(mean): --  RR: 18 (15 Mar 2022 13:59) (17 - 18)  SpO2: 94% (15 Mar 2022 13:59) (94% - 95%)  CAPILLARY BLOOD GLUCOSE      POCT Blood Glucose.: 209 mg/dL (15 Mar 2022 12:07)  POCT Blood Glucose.: 137 mg/dL (15 Mar 2022 08:43)  POCT Blood Glucose.: 215 mg/dL (14 Mar 2022 21:58)  POCT Blood Glucose.: 114 mg/dL (14 Mar 2022 17:52)    I&O's Summary    14 Mar 2022 07:01  -  15 Mar 2022 07:00  --------------------------------------------------------  IN: 855 mL / OUT: 1830 mL / NET: -975 mL    15 Mar 2022 07:01  -  15 Mar 2022 16:09  --------------------------------------------------------  IN: 480 mL / OUT: 320 mL / NET: 160 mL          PHYSICAL EXAM:   GENERAL: NAD,    HEAD:  Atraumatic, Normocephalic  EYES:  conjunctiva and sclera clear  NECK: Supple,    CHEST/LUNG: Clear to auscultation bilaterally; No wheeze  HEART: S1S2 normal. Regular rate and rhythm; No murmurs, rubs, or gallops  ABDOMEN: Soft, Nontender, Nondistended; Bowel sounds present  EXTREMITIES:   No clubbing, cyanosis, or edema  PSYCH/Neuro: AAOx3. Non-focal. slight tremor in hands.   SKIN: No rashes or lesions      LABS:                        12.7   10.30 )-----------( 183      ( 14 Mar 2022 10:11 )             39.8     03-15    140  |  101  |  85<H>  ----------------------------<  143<H>  3.8   |  21<L>  |  5.44<H>    Ca    9.1      15 Mar 2022 08:54  Phos  5.5     03-14  Mg     2.3     03-14      < from: Transthoracic Echocardiogram (03.15.22 @ 07:53) >  Conclusions:  Sonographer's note: patient refused administration of an  ultrasound enhancing agent.  Normal left ventricular internal dimensions and wall  thicknesses.  Estimated ejection fraciton 55%. Suboptimal endocardial  resolution precludes assessment of regional wall motion.  Mild-moderate aortic regurgitation.  The ascending aorta is severely dilated (6.2 cm).  Study interpreted at 9: 20 AM. Results discussed with MsShanwa  Lyn XOCHITL at  9:56 AM, 03/15/2022.  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    Dilated ascending aorta measuring 6.9 cm.            --- End of Report ---    < end of copied text >        RADIOLOGY & ADDITIONAL TESTS:    Imaging Personally Reviewed: echo report. CT chest report.   Consultant(s) Notes Reviewed:  renal  Care Discussed with Consultants/Other Providers: renal

## 2022-03-15 NOTE — DIETITIAN INITIAL EVALUATION ADULT. - PROBLEM SELECTOR PLAN 4
- DVT ppx: heparin subq  - GI ppx: none  - Diet: renal, low sodium, fluid restricted  - Code status: DNR/DNI-- confirmed at the time of admission  - HCP: Reilly Chawla (c: [phone]/h: [phone]), gabrielle

## 2022-03-15 NOTE — DIETITIAN INITIAL EVALUATION ADULT. - OTHER INFO
At attempted visit this morning, pt off floor at echo. At subsequent attempted visits this morning and afternoon, pt sleeping soundly - interview deferred. Per NP note today (3/15), pt becoming more confused. Per flowsheets pt with poor PO intake during admission. Fecal incontinence noted. Last documented BM yesterday (3/14). Per Patient Profile, pt swallows foods/liquids without difficulty. NKFA. Weight history per [facility] HIE: 84.8 kg (2/28/22), 84.8 kg (3/5/22). Dosing weight 86.2 kg suggests weight gain. Will continue to monitor and trend weights as able.

## 2022-03-15 NOTE — DIETITIAN INITIAL EVALUATION ADULT. - DIET TYPE
Remove DASH restrictions in setting of poor PO intake. Adjust diet PRN./renal replacement pts:no protein restr,no conc K & phos, low sodium

## 2022-03-15 NOTE — PROGRESS NOTE ADULT - SUBJECTIVE AND OBJECTIVE BOX
[facility] Division of Kidney Diseases & Hypertension  FOLLOW UP NOTE  [phone]--------------------------------------------------------------------------------  Chief Complaint:Shortness of breath        24 hour events/subjective: Patient seen & examined. Labs & vitals reviewed. Feels less SOB today.  UOP 1.8L in 24 hrs.         PAST HISTORY  --------------------------------------------------------------------------------  No significant changes to PMH, PSH, FHx, SHx, unless otherwise noted    ALLERGIES & MEDICATIONS  --------------------------------------------------------------------------------  Allergies    No Known Allergies    Intolerances      Standing Inpatient Medications  amLODIPine   Tablet 5 milliGRAM(s) Oral daily  dextrose 40% Gel 15 Gram(s) Oral once  dextrose 5%. 1000 milliLiter(s) IV Continuous <Continuous>  dextrose 5%. 1000 milliLiter(s) IV Continuous <Continuous>  dextrose 50% Injectable 25 Gram(s) IV Push once  dextrose 50% Injectable 12.5 Gram(s) IV Push once  dextrose 50% Injectable 25 Gram(s) IV Push once  glucagon  Injectable 1 milliGRAM(s) IntraMuscular once  heparin   Injectable 5000 Unit(s) SubCutaneous every 12 hours  insulin lispro (ADMELOG) corrective regimen sliding scale   SubCutaneous three times a day before meals  insulin lispro (ADMELOG) corrective regimen sliding scale   SubCutaneous at bedtime  tamsulosin 0.8 milliGRAM(s) Oral at bedtime    PRN Inpatient Medications  acetaminophen     Tablet .. 650 milliGRAM(s) Oral every 6 hours PRN  aluminum hydroxide/magnesium hydroxide/simethicone Suspension 30 milliLiter(s) Oral every 4 hours PRN  loperamide 2 milliGRAM(s) Oral every 6 hours PRN  melatonin 3 milliGRAM(s) Oral at bedtime PRN  ondansetron Injectable 4 milliGRAM(s) IV Push every 8 hours PRN      REVIEW OF SYSTEMS  --------------------------------------------------------------------------------  Gen: No  fevers/chills  Respiratory: No dyspnea, cough  CV: No chest pain  GI: No abdominal pain, diarrhea,  nausea, vomiting  : No increased frequency, dysuria, hematuria  MSK:  no edema  Neuro: No dizziness/lightheadedness      All other systems were reviewed and are negative, except as noted.    VITALS/PHYSICAL EXAM  --------------------------------------------------------------------------------  T(C): 36.6 (03-15-22 @ 06:24), Max: 36.8 (03-14-22 @ 21:21)  HR: 77 (03-15-22 @ 06:24) (77 - 87)  BP: 137/76 (03-15-22 @ 06:24) (114/62 - 153/78)  RR: 17 (03-15-22 @ 06:24) (17 - 18)  SpO2: 95% (03-15-22 @ 06:24) (94% - 95%)  Wt(kg): --        03-14-22 @ 07:01  -  03-15-22 @ 07:00  --------------------------------------------------------  IN: 855 mL / OUT: 1830 mL / NET: -975 mL    03-15-22 @ 07:01  -  03-15-22 @ 09:52  --------------------------------------------------------  IN: 0 mL / OUT: 120 mL / NET: -120 mL      Physical Exam:  	Gen: elderly obese man, NAD, on NC  	HEENT: Anicteric  	Pulm:  rales bilaterally  	CV: S1S2+  	Abd: Soft, +BS, obese abdomen  	Ext: LE edema B/L+  	Neuro: Awake  	Skin: Warm and dry    LABS/STUDIES  --------------------------------------------------------------------------------              12.7   10.30 >-----------<  183      [03-14-22 @ 10:11]              39.8     140  |  101  |  85  ----------------------------<  143      [03-15-22 @ 08:54]  3.8   |  21  |  5.44        Ca     9.1     [03-15-22 @ 08:54]      Mg     2.3     [03-14-22 @ 10:11]      Phos  5.5     [03-14-22 @ 10:11]            Creatinine Trend:  SCr 5.44 [03-15 @ 08:54]  SCr 5.21 [03-14 @ 10:11]  SCr 4.74 [03-13 @ 07:04]  SCr 4.72 [03-12 @ 07:18]  SCr 4.62 [03-11 @ 16:54]    Urinalysis - [03-11-22 @ 18:58]      Color Light Yellow / Appearance Clear / SG 1.015 / pH 6.0      Gluc Trace / Ketone Negative  / Bili Negative / Urobili Negative       Blood Small / Protein 100 mg/dL / Leuk Est Moderate / Nitrite Positive      RBC 4 / WBC 29 / Hyaline 1 / Gran  / Sq Epi  / Non Sq Epi 0 / Bacteria Few    Urine Creatinine 76      [03-12-22 @ 17:19]  Urine Protein 133      [03-13-22 @ 00:13]  Urine Sodium 63      [03-12-22 @ 17:19]  Urine Urea Nitrogen 502      [03-12-22 @ 04:19]  Urine Potassium 27      [03-11-22 @ 18:58]  Urine Osmolality 385      [03-11-22 @ 18:58]

## 2022-03-15 NOTE — DIETITIAN INITIAL EVALUATION ADULT. - ADD RECOMMEND
Consider Nephrovite supplement if no medical contraindications. Monitor PO intake, weight, labs, skin, GI status, diet.

## 2022-03-15 NOTE — CHART NOTE - NSCHARTNOTEFT_GEN_A_CORE
Patient is a 86y old  Male who presents with a chief complaint of volume overload secondary to chronic kidney disease (15 Mar 2022 10:50)  Called by Dr. Butterfield in echo lab with critical value of "ascending aorta severely dilated to 6.2 cm on routine echo.    Vital Signs Last 24 Hrs  T(C): 36.6 (15 Mar 2022 06:24), Max: 36.8 (14 Mar 2022 21:21)  T(F): 97.8 (15 Mar 2022 06:24), Max: 98.2 (14 Mar 2022 21:21)  HR: 77 (15 Mar 2022 06:24) (77 - 87)  BP: 137/76 (15 Mar 2022 06:24) (114/62 - 153/78)  BP(mean): --  RR: 17 (15 Mar 2022 06:24) (17 - 18)  SpO2: 95% (15 Mar 2022 06:24) (94% - 95%)      Labs:                          12.7   10.30 )-----------( 183      ( 14 Mar 2022 10:11 )             39.8     03-15    140  |  101  |  85<H>  ----------------------------<  143<H>  3.8   |  21<L>  |  5.44<H>    Ca    9.1      15 Mar 2022 08:54  Phos  5.5     03-14  Mg     2.3     03-14              Radiology:    Physical Exam:  General: WN/WD NAD  Neurology: A&Ox3, nonfocal, BUSTOS x 4  Head:  Normocephalic, atraumatic  Respiratory: CTA B/L  CV: RRR, S1S2, no murmur  Abdominal: Soft, NT, ND no palpable mass  MSK: No edema, + peripheral pulses, FROM all 4 extremity    Assessment & Plan:  HPI:  86 year old male with a PMHx of HTN, BPH, and CKD Stage V (deferring dialysis) presents as per instruction by his nephrologist for volume overload. The patient has a longstanding history of CKD and has consistent deferred dialysis. He ambulation is mainly limited by his history of arthritis of bilateral knees, but he does endorse shortness of breath on exertion walking ground level without associated chest pain. He also describes persistent dry cough, occurring typically at rest when laying in bed. His diuretics outpatient was recently increased; he is currently on a total of 30mg of Torsemide per day (20mg in the morning and 10mg in the evening) and reports good urine output. He denies any dysuria, difficulty initiating urine stream, or abdominal pain. No fevers or chills.    Vitals: afebrile, HR 78, /79, SpO2 95% on room air.  Labs: CBC unremarkable. CMP shows BUN/Cr of 61/4.62 (Most recent Scr on OP labs was elevated at 4.38 on 02/7/22). Phos elevated to 4.7.   CXR: pulmonary edema vs infection (11 Mar 2022 20:13)    >critical report taken  >Dr. Dong made aware  >Pt with CKD 5 refusing HD, and becoming more confused will discuss  > Patient is a 86y old  Male who presents with a chief complaint of volume overload secondary to chronic kidney disease (15 Mar 2022 10:50)  Called by Dr. Butterfield in echo lab with critical value of "ascending aorta severely dilated to 6.2 cm on routine echo.    Vital Signs Last 24 Hrs  T(C): 36.6 (15 Mar 2022 06:24), Max: 36.8 (14 Mar 2022 21:21)  T(F): 97.8 (15 Mar 2022 06:24), Max: 98.2 (14 Mar 2022 21:21)  HR: 77 (15 Mar 2022 06:24) (77 - 87)  BP: 137/76 (15 Mar 2022 06:24) (114/62 - 153/78)  BP(mean): --  RR: 17 (15 Mar 2022 06:24) (17 - 18)  SpO2: 95% (15 Mar 2022 06:24) (94% - 95%)      Labs:                          12.7   10.30 )-----------( 183      ( 14 Mar 2022 10:11 )             39.8     03-15    140  |  101  |  85<H>  ----------------------------<  143<H>  3.8   |  21<L>  |  5.44<H>    Ca    9.1      15 Mar 2022 08:54  Phos  5.5     03-14  Mg     2.3     03-14              Radiology:    Physical Exam:  General: WN/WD NAD  Neurology: A&Ox3, nonfocal, BUSTOS x 4  Head:  Normocephalic, atraumatic  Respiratory: CTA B/L  CV: RRR, S1S2, no murmur  Abdominal: Soft, NT, ND no palpable mass  MSK: No edema, + peripheral pulses, FROM all 4 extremity    Assessment & Plan:  HPI:  86 year old male with a PMHx of HTN, BPH, and CKD Stage V (deferring dialysis) presents as per instruction by his nephrologist for volume overload. The patient has a longstanding history of CKD and has consistent deferred dialysis. He ambulation is mainly limited by his history of arthritis of bilateral knees, but he does endorse shortness of breath on exertion walking ground level without associated chest pain. He also describes persistent dry cough, occurring typically at rest when laying in bed. His diuretics outpatient was recently increased; he is currently on a total of 30mg of Torsemide per day (20mg in the morning and 10mg in the evening) and reports good urine output. He denies any dysuria, difficulty initiating urine stream, or abdominal pain. No fevers or chills.    Vitals: afebrile, HR 78, /79, SpO2 95% on room air.  Labs: CBC unremarkable. CMP shows BUN/Cr of 61/4.62 (Most recent Scr on OP labs was elevated at 4.38 on 02/7/22). Phos elevated to 4.7.   CXR: pulmonary edema vs infection (11 Mar 2022 20:13)    >critical report taken  >Dr. Dong made aware  >Pt with CKD 5 refusing HD, and becoming more confused will discuss with family medical [place] [id number]  >

## 2022-03-15 NOTE — DIETITIAN INITIAL EVALUATION ADULT. - PROBLEM SELECTOR PLAN 1
See by nephrology fellow in the ED, appreciate recommendations  - pt declines hemodialysis; will manage volume status with diuretics  - pt takes torsemide 20mg qAM and 10mg qPM  - f/u urine lytes, urine urea, and spot urine TP/CR  - U/A results noted-- patient has no symptoms of UTI; will hold off on antibiotics  - Bumex 2mg IV x1 now  - bladder scan  - obtain RUQ ultrasound to assess for ascites  - strict I/O  - avoid nephrotoxins; dose meds per eGFR

## 2022-03-15 NOTE — DIETITIAN INITIAL EVALUATION ADULT. - PERTINENT LABORATORY DATA
BUN 85, Cr 5.44, glucose 143  HbA1c (3/13): 7.8%    CAPILLARY BLOOD GLUCOSE  POCT Blood Glucose.: 209 mg/dL (15 Mar 2022 12:07)  POCT Blood Glucose.: 137 mg/dL (15 Mar 2022 08:43)  POCT Blood Glucose.: 215 mg/dL (14 Mar 2022 21:58)  POCT Blood Glucose.: 114 mg/dL (14 Mar 2022 17:52)

## 2022-03-15 NOTE — DIETITIAN INITIAL EVALUATION ADULT. - REASON INDICATOR FOR ASSESSMENT
Consult for assessment and education  Source: EMR/chart review    Pt is an 85 yo male with PMH of HTN, BPH, and CKD stage V (deferring dialysis) who presented as per instruction by nephrologist for volume overload. Admitted 3/11.

## 2022-03-15 NOTE — PROGRESS NOTE ADULT - PROBLEM SELECTOR PLAN 4
- DVT ppx: heparin subq  - GI ppx: none  - Diet: renal, dash  - Code status: DNR/DNI-- confirmed at the time of admission  - HCP: Reilly Chawla (c: [phone]/h: [phone]), nephew. -updated him on 3/15.   -PT eval recs home PT. -He lives in assisted living.     5. Discussed plan with ACP and RN and renal team and RN. - DVT ppx: heparin subq  - GI ppx: none  - Diet: renal, dash  - Code status: DNR/DNI-- confirmed at the time of admission  - HCP: Reilly Chawla (c: [phone]/h: [phone]), nephew. -updated him on 3/15.   -PT eval recs home PT. -He lives in assisted living.     5. Discussed plan with ACP and RN and renal team and RN and HCP.

## 2022-03-15 NOTE — PROGRESS NOTE ADULT - PROBLEM SELECTOR PLAN 1
See by nephrology fellow in the ED, appreciate recommendations  - pt declines hemodialysis; will manage volume status with diuretics  - pt takes torsemide 20mg qAM and 10mg qPM. - holding now.   - f/u urine lytes, urine urea, and spot urine TP/CR. -proteinuria noted. F/u renal recs.  - U/A results noted-- patient has no symptoms of UTI. -F/u UCx - showed coag negative staph, -will treat empirically with CTX IV x 3 days.   -s/p Bumex 2mg IV x1 on admission. -s/p Bumex 1mg IV BID for now. -per renal recs, stop iv 2mg bid and change to po 2mg bid.  - bladder scan q8h - not retaining. -strict i's/o's.   -c/w increased home flomax to 0.8mg qhs to help reduce any component due to ?urinary retention.  - obtain abdominal ultrasound to assess for ascites and any renal/uro structural issues. - no hydronephrosis but enlarged prostate.  - strict I/O's  - avoid nephrotoxins; dose meds per eGFR  -renal recs appreciated.   -echo showed mild to moderate AR. -BNP mildly elevated but improved on repeat.  -stool studies in any diarrhea - testing.  -monitor bmp, mg, phos, vbg.  -Cr worse again today.  -Repeat CXR seems improved from admission. -CT chest with incidentally found 6.2cm aortic aneurysm. no pulm edema now. Appears to have had prior asbestos exposure. -Discussed with HCP nephew who agrees with medical management only.   -will check CRP, ESR, PCT - f/u  -avoid excessive fluid intake.

## 2022-03-15 NOTE — PROGRESS NOTE ADULT - PROBLEM SELECTOR PLAN 1
Pt. with advanced CKD stage 5 and fluid overload. Pt. follows Op Nephrologist Dr. Mccord and was recently seen in Feb 2022. Pt. with advanced CKD and has prior refused to initiate HD. Most recent Scr on OP labs was elevated at 4.38 on 02/7/22. Scr on admission elevated at 4.6 and BUN of 60.  Today SCr 5.4 & BUN 85. UA done on admission showed hematuria and proteinuria. Pt. failed Torsemide 30 mg PO at home. CXR s/o pulmonary vascular congestion. Volume status with improvement. Continue Bumex 2 mg IV bid. Pt with hx of  BPH,  bladder scan with 75cc PVR. Strict I & O. Monitor labs and urine output. Avoid nephrotoxins. Dose medications as per eGFR.            If you have any questions, please feel free to contact me  Annalisa Bose  Nephrology Fellow  Pager NS: [phone]/ [facility]: [phone]    (After 5 pm or on weekends please page the on-call fellow, can check [email or web address] for schedule. Login is vernell sheffield, schedule under [facility] medicine, psych, derm) Pt. with advanced CKD stage 5 and fluid overload. Pt. follows Op Nephrologist Dr. Mccord and was recently seen in Feb 2022. Pt. with advanced CKD and has prior refused to initiate HD. Most recent Scr on OP labs was elevated at 4.38 on 02/7/22. Scr on admission elevated at 4.6 and BUN of 60.  Today SCr 5.4 & BUN 85. UA done on admission showed hematuria and proteinuria. Pt. failed Torsemide 30 mg PO at home. CXR with improved pulmonary vascular congestion. CT cehst noted with pleural thickening & calcifications likely prior asbestos exposure.  Volume status with improvement. Switch Bumex 2 mg IV bid to 2mg PO bid. Pt with hx of  BPH,  bladder scan with 75cc PVR. Strict I & O. Monitor labs and urine output. Avoid nephrotoxins. Dose medications as per eGFR. Needs to be OOB. PT eval for return to Assisted living    Disccussed with primary team & pt's outpatient nephrologist Dr. Davidson            If you have any questions, please feel free to contact rainer Bose  Nephrology Fellow  Pager NS: [phone]/ [facility]: [phone]    (After 5 pm or on weekends please page the on-call fellow, can check [email or web address] for schedule. Login is vernell sheffield, schedule under [facility] medicine, psych, derm) Pt. with advanced CKD stage 5 and fluid overload. Pt. follows Op Nephrologist Dr. Mccord and was recently seen in Feb 2022. Pt. with advanced CKD and has prior refused to initiate HD. Most recent Scr on OP labs was elevated at 4.38 on 02/7/22. Scr on admission elevated at 4.6 and BUN of 60.  Today SCr 5.4 & BUN 85. UA done on admission showed hematuria and proteinuria. Pt. failed Torsemide 30 mg PO at home. CXR with improved pulmonary vascular congestion. 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  Volume status with improvement. Switch Bumex 2 mg IV bid to 2mg PO bid. Pt with hx of  BPH,  bladder scan with 75cc PVR. Strict I & O. Monitor labs and urine output. Avoid nephrotoxins. Dose medications as per eGFR. Needs to be OOB. PT eval for return to Assisted living    Discussed with primary team & pt's outpatient nephrologist Dr. Davidson            If you have any questions, please feel free to contact rainer Bose  Nephrology Fellow  Pager NS: [phone]/ [facility]: [phone]    (After 5 pm or on weekends please page the on-call fellow, can check [email or web address] for schedule. Login is vernell sheffield, schedule under [facility] medicine, psych, derm)

## 2022-03-16 DIAGNOSIS — N25.0 RENAL OSTEODYSTROPHY: ICD-10-CM

## 2022-03-16 DIAGNOSIS — E87.2 ACIDOSIS: ICD-10-CM

## 2022-03-16 DIAGNOSIS — N18.9 CHRONIC KIDNEY DISEASE, UNSPECIFIED: ICD-10-CM

## 2022-03-16 LAB
ALBUMIN SERPL ELPH-MCNC: 4.1 G/DL — SIGNIFICANT CHANGE UP (ref 3.3–5)
ALP SERPL-CCNC: 79 U/L — SIGNIFICANT CHANGE UP (ref 40–120)
ALT FLD-CCNC: 24 U/L — SIGNIFICANT CHANGE UP (ref 10–45)
ANION GAP SERPL CALC-SCNC: 18 MMOL/L — HIGH (ref 5–17)
AST SERPL-CCNC: 13 U/L — SIGNIFICANT CHANGE UP (ref 10–40)
BILIRUB SERPL-MCNC: 0.4 MG/DL — SIGNIFICANT CHANGE UP (ref 0.2–1.2)
BUN SERPL-MCNC: 96 MG/DL — HIGH (ref 7–23)
CALCIUM SERPL-MCNC: 9.2 MG/DL — SIGNIFICANT CHANGE UP (ref 8.4–10.5)
CHLORIDE SERPL-SCNC: 101 MMOL/L — SIGNIFICANT CHANGE UP (ref 96–108)
CO2 SERPL-SCNC: 21 MMOL/L — LOW (ref 22–31)
CREAT SERPL-MCNC: 5.37 MG/DL — HIGH (ref 0.5–1.3)
CULTURE RESULTS: SIGNIFICANT CHANGE UP
EGFR: 10 ML/MIN/1.73M2 — LOW
GLUCOSE BLDC GLUCOMTR-MCNC: 132 MG/DL — HIGH (ref 70–99)
GLUCOSE BLDC GLUCOMTR-MCNC: 134 MG/DL — HIGH (ref 70–99)
GLUCOSE BLDC GLUCOMTR-MCNC: 135 MG/DL — HIGH (ref 70–99)
GLUCOSE BLDC GLUCOMTR-MCNC: 174 MG/DL — HIGH (ref 70–99)
GLUCOSE SERPL-MCNC: 127 MG/DL — HIGH (ref 70–99)
HCT VFR BLD CALC: 39.3 % — SIGNIFICANT CHANGE UP (ref 39–50)
HGB BLD-MCNC: 12.5 G/DL — LOW (ref 13–17)
MCHC RBC-ENTMCNC: 28.9 PG — SIGNIFICANT CHANGE UP (ref 27–34)
MCHC RBC-ENTMCNC: 31.8 GM/DL — LOW (ref 32–36)
MCV RBC AUTO: 91 FL — SIGNIFICANT CHANGE UP (ref 80–100)
NRBC # BLD: 0 /100 WBCS — SIGNIFICANT CHANGE UP (ref 0–0)
PLATELET # BLD AUTO: 200 K/UL — SIGNIFICANT CHANGE UP (ref 150–400)
POTASSIUM SERPL-MCNC: 3.8 MMOL/L — SIGNIFICANT CHANGE UP (ref 3.5–5.3)
POTASSIUM SERPL-SCNC: 3.8 MMOL/L — SIGNIFICANT CHANGE UP (ref 3.5–5.3)
PROT SERPL-MCNC: 7.4 G/DL — SIGNIFICANT CHANGE UP (ref 6–8.3)
RBC # BLD: 4.32 M/UL — SIGNIFICANT CHANGE UP (ref 4.2–5.8)
RBC # FLD: 12.7 % — SIGNIFICANT CHANGE UP (ref 10.3–14.5)
SARS-COV-2 RNA SPEC QL NAA+PROBE: SIGNIFICANT CHANGE UP
SODIUM SERPL-SCNC: 140 MMOL/L — SIGNIFICANT CHANGE UP (ref 135–145)
SPECIMEN SOURCE: SIGNIFICANT CHANGE UP
WBC # BLD: 9.65 K/UL — SIGNIFICANT CHANGE UP (ref 3.8–10.5)
WBC # FLD AUTO: 9.65 K/UL — SIGNIFICANT CHANGE UP (ref 3.8–10.5)

## 2022-03-16 PROCEDURE — 99233 SBSQ HOSP IP/OBS HIGH 50: CPT | Mod: GC

## 2022-03-16 PROCEDURE — 99232 SBSQ HOSP IP/OBS MODERATE 35: CPT | Mod: GC

## 2022-03-16 RX ADMIN — CEFTRIAXONE 100 MILLIGRAM(S): 500 INJECTION, POWDER, FOR SOLUTION INTRAMUSCULAR; INTRAVENOUS at 11:45

## 2022-03-16 RX ADMIN — HEPARIN SODIUM 5000 UNIT(S): 5000 INJECTION INTRAVENOUS; SUBCUTANEOUS at 18:34

## 2022-03-16 RX ADMIN — AMLODIPINE BESYLATE 5 MILLIGRAM(S): 2.5 TABLET ORAL at 09:14

## 2022-03-16 RX ADMIN — BUMETANIDE 2 MILLIGRAM(S): 0.25 INJECTION INTRAMUSCULAR; INTRAVENOUS at 18:34

## 2022-03-16 RX ADMIN — HEPARIN SODIUM 5000 UNIT(S): 5000 INJECTION INTRAVENOUS; SUBCUTANEOUS at 09:15

## 2022-03-16 RX ADMIN — BUMETANIDE 2 MILLIGRAM(S): 0.25 INJECTION INTRAMUSCULAR; INTRAVENOUS at 09:14

## 2022-03-16 NOTE — PROGRESS NOTE ADULT - PROBLEM SELECTOR PLAN 4
Hg at goal  f/u CBC                If you have any questions, please feel free to contact me  Annalisa Bose  Nephrology Fellow  Pager NS: [phone]/ [facility]: [phone]    (After 5 pm or on weekends please page the on-call fellow, can check [email or web address] for schedule. Login is vernell sheffield, schedule under [facility] medicine, psych, derm)

## 2022-03-16 NOTE — PROGRESS NOTE ADULT - PROBLEM SELECTOR PLAN 4
- DVT ppx: heparin subq  - GI ppx: none  - Diet: renal, dash  - Code status: DNR/DNI-- confirmed at the time of admission  - HCP: Reilly Ovalle (c: [phone]/h: [phone]), nephew. -updated him on 3/15.   -PT eval recs home PT. -He lives in assisted living.

## 2022-03-16 NOTE — PROGRESS NOTE ADULT - SUBJECTIVE AND OBJECTIVE BOX
[facility] Division of Kidney Diseases & Hypertension  FOLLOW UP NOTE  [phone]--------------------------------------------------------------------------------  Chief Complaint:Shortness of breath        24 hour events/subjective: Patient seen & examined. Labs & vitals reviewed.  mL with net -490 mL in 24 hrs. SOB better today. 96% on RA    PAST HISTORY  --------------------------------------------------------------------------------  No significant changes to PMH, PSH, FHx, SHx, unless otherwise noted    ALLERGIES & MEDICATIONS  --------------------------------------------------------------------------------  Allergies    No Known Allergies    Intolerances      Standing Inpatient Medications  amLODIPine   Tablet 5 milliGRAM(s) Oral daily  buMETAnide 2 milliGRAM(s) Oral two times a day  cefTRIAXone   IVPB      cefTRIAXone   IVPB 1000 milliGRAM(s) IV Intermittent every 24 hours  dextrose 40% Gel 15 Gram(s) Oral once  dextrose 5%. 1000 milliLiter(s) IV Continuous <Continuous>  dextrose 5%. 1000 milliLiter(s) IV Continuous <Continuous>  dextrose 50% Injectable 25 Gram(s) IV Push once  dextrose 50% Injectable 12.5 Gram(s) IV Push once  dextrose 50% Injectable 25 Gram(s) IV Push once  glucagon  Injectable 1 milliGRAM(s) IntraMuscular once  heparin   Injectable 5000 Unit(s) SubCutaneous every 12 hours  insulin lispro (ADMELOG) corrective regimen sliding scale   SubCutaneous three times a day before meals  insulin lispro (ADMELOG) corrective regimen sliding scale   SubCutaneous at bedtime  tamsulosin 0.8 milliGRAM(s) Oral at bedtime    PRN Inpatient Medications  acetaminophen     Tablet .. 650 milliGRAM(s) Oral every 6 hours PRN  aluminum hydroxide/magnesium hydroxide/simethicone Suspension 30 milliLiter(s) Oral every 4 hours PRN  loperamide 2 milliGRAM(s) Oral every 6 hours PRN  melatonin 3 milliGRAM(s) Oral at bedtime PRN  ondansetron Injectable 4 milliGRAM(s) IV Push every 8 hours PRN      REVIEW OF SYSTEMS  --------------------------------------------------------------------------------  Gen: No  fevers/chills  Respiratory: No dyspnea, cough  CV: No chest pain  GI: No abdominal pain, diarrhea,  nausea, vomiting  : No increased frequency, dysuria, hematuria  MSK:  no edema  Neuro: No dizziness/lightheadedness      All other systems were reviewed and are negative, except as noted.    VITALS/PHYSICAL EXAM  --------------------------------------------------------------------------------  T(C): 36.8 (03-16-22 @ 09:06), Max: 36.8 (03-15-22 @ 20:30)  HR: 75 (03-16-22 @ 09:06) (75 - 80)  BP: 132/68 (03-16-22 @ 09:06) (122/69 - 145/76)  RR: 17 (03-16-22 @ 09:06) (17 - 18)  SpO2: 96% (03-16-22 @ 09:06) (94% - 96%)  Wt(kg): --        03-15-22 @ 07:01  -  03-16-22 @ 07:00  --------------------------------------------------------  IN: 480 mL / OUT: 970 mL / NET: -490 mL      Physical Exam:  	Gen: elderly obese man, NAD, on NC  	HEENT: Anicteric  	Pulm:  rales bilaterally  	CV: S1S2+  	Abd: Soft, +BS, obese abdomen  	Ext: LE edema B/L+  	Neuro: Awake  	Skin: Warm and dry    LABS/STUDIES  --------------------------------------------------------------------------------              12.5   9.65  >-----------<  200      [03-16-22 @ 09:16]              39.3     140  |  101  |  96  ----------------------------<  127      [03-16-22 @ 09:16]  3.8   |  21  |  5.37        Ca     9.2     [03-16-22 @ 09:16]      Mg     2.3     [03-14-22 @ 10:11]      Phos  5.5     [03-14-22 @ 10:11]    TPro  7.4  /  Alb  4.1  /  TBili  0.4  /  DBili  x   /  AST  13  /  ALT  24  /  AlkPhos  79  [03-16-22 @ 09:16]          Creatinine Trend:  SCr 5.37 [03-16 @ 09:16]  SCr 5.44 [03-15 @ 08:54]  SCr 5.21 [03-14 @ 10:11]  SCr 4.74 [03-13 @ 07:04]  SCr 4.72 [03-12 @ 07:18]    Urinalysis - [03-11-22 @ 18:58]      Color Light Yellow / Appearance Clear / SG 1.015 / pH 6.0      Gluc Trace / Ketone Negative  / Bili Negative / Urobili Negative       Blood Small / Protein 100 mg/dL / Leuk Est Moderate / Nitrite Positive      RBC 4 / WBC 29 / Hyaline 1 / Gran  / Sq Epi  / Non Sq Epi 0 / Bacteria Few    Urine Creatinine 76      [03-12-22 @ 17:19]  Urine Protein 133      [03-13-22 @ 00:13]  Urine Sodium 63      [03-12-22 @ 17:19]  Urine Urea Nitrogen 502      [03-12-22 @ 04:19]  Urine Potassium 27      [03-11-22 @ 18:58]  Urine Osmolality 385      [03-11-22 @ 18:58]         [facility] Division of Kidney Diseases & Hypertension  FOLLOW UP NOTE  [phone]--------------------------------------------------------------------------------  Chief Complaint:Shortness of breath        24 hour events/subjective: Patient seen & examined. Labs & vitals reviewed. Appears confused. C/o generalized pain.   mL with net -490 mL in 24 hrs. SOB better today. 96% on RA    PAST HISTORY  --------------------------------------------------------------------------------  No significant changes to PMH, PSH, FHx, SHx, unless otherwise noted    ALLERGIES & MEDICATIONS  --------------------------------------------------------------------------------  Allergies    No Known Allergies    Intolerances      Standing Inpatient Medications  amLODIPine   Tablet 5 milliGRAM(s) Oral daily  buMETAnide 2 milliGRAM(s) Oral two times a day  cefTRIAXone   IVPB      cefTRIAXone   IVPB 1000 milliGRAM(s) IV Intermittent every 24 hours  dextrose 40% Gel 15 Gram(s) Oral once  dextrose 5%. 1000 milliLiter(s) IV Continuous <Continuous>  dextrose 5%. 1000 milliLiter(s) IV Continuous <Continuous>  dextrose 50% Injectable 25 Gram(s) IV Push once  dextrose 50% Injectable 12.5 Gram(s) IV Push once  dextrose 50% Injectable 25 Gram(s) IV Push once  glucagon  Injectable 1 milliGRAM(s) IntraMuscular once  heparin   Injectable 5000 Unit(s) SubCutaneous every 12 hours  insulin lispro (ADMELOG) corrective regimen sliding scale   SubCutaneous three times a day before meals  insulin lispro (ADMELOG) corrective regimen sliding scale   SubCutaneous at bedtime  tamsulosin 0.8 milliGRAM(s) Oral at bedtime    PRN Inpatient Medications  acetaminophen     Tablet .. 650 milliGRAM(s) Oral every 6 hours PRN  aluminum hydroxide/magnesium hydroxide/simethicone Suspension 30 milliLiter(s) Oral every 4 hours PRN  loperamide 2 milliGRAM(s) Oral every 6 hours PRN  melatonin 3 milliGRAM(s) Oral at bedtime PRN  ondansetron Injectable 4 milliGRAM(s) IV Push every 8 hours PRN      REVIEW OF SYSTEMS  --------------------------------------------------------------------------------  Gen: No  fevers/chills  Respiratory: No dyspnea, cough  CV: No chest pain  GI: No abdominal pain, diarrhea,  nausea, vomiting  : No increased frequency, dysuria, hematuria  MSK:  no edema  Neuro: No dizziness/lightheadedness      All other systems were reviewed and are negative, except as noted.    VITALS/PHYSICAL EXAM  --------------------------------------------------------------------------------  T(C): 36.8 (03-16-22 @ 09:06), Max: 36.8 (03-15-22 @ 20:30)  HR: 75 (03-16-22 @ 09:06) (75 - 80)  BP: 132/68 (03-16-22 @ 09:06) (122/69 - 145/76)  RR: 17 (03-16-22 @ 09:06) (17 - 18)  SpO2: 96% (03-16-22 @ 09:06) (94% - 96%)  Wt(kg): --        03-15-22 @ 07:01  -  03-16-22 @ 07:00  --------------------------------------------------------  IN: 480 mL / OUT: 970 mL / NET: -490 mL      Physical Exam:  	Gen: elderly obese man, NAD, on NC  	HEENT: Anicteric  	Pulm:  rales bilaterally  	CV: S1S2+  	Abd: Soft, +BS, obese abdomen  	Ext: LE edema B/L+  	Neuro: Awake, confused  	Skin: Warm and dry    LABS/STUDIES  --------------------------------------------------------------------------------              12.5   9.65  >-----------<  200      [03-16-22 @ 09:16]              39.3     140  |  101  |  96  ----------------------------<  127      [03-16-22 @ 09:16]  3.8   |  21  |  5.37        Ca     9.2     [03-16-22 @ 09:16]      Mg     2.3     [03-14-22 @ 10:11]      Phos  5.5     [03-14-22 @ 10:11]    TPro  7.4  /  Alb  4.1  /  TBili  0.4  /  DBili  x   /  AST  13  /  ALT  24  /  AlkPhos  79  [03-16-22 @ 09:16]          Creatinine Trend:  SCr 5.37 [03-16 @ 09:16]  SCr 5.44 [03-15 @ 08:54]  SCr 5.21 [03-14 @ 10:11]  SCr 4.74 [03-13 @ 07:04]  SCr 4.72 [03-12 @ 07:18]    Urinalysis - [03-11-22 @ 18:58]      Color Light Yellow / Appearance Clear / SG 1.015 / pH 6.0      Gluc Trace / Ketone Negative  / Bili Negative / Urobili Negative       Blood Small / Protein 100 mg/dL / Leuk Est Moderate / Nitrite Positive      RBC 4 / WBC 29 / Hyaline 1 / Gran  / Sq Epi  / Non Sq Epi 0 / Bacteria Few    Urine Creatinine 76      [03-12-22 @ 17:19]  Urine Protein 133      [03-13-22 @ 00:13]  Urine Sodium 63      [03-12-22 @ 17:19]  Urine Urea Nitrogen 502      [03-12-22 @ 04:19]  Urine Potassium 27      [03-11-22 @ 18:58]  Urine Osmolality 385      [03-11-22 @ 18:58]

## 2022-03-16 NOTE — PROGRESS NOTE ADULT - PROBLEM SELECTOR PLAN 1
Pt. with advanced CKD stage 5 and fluid overload. Pt. follows Op Nephrologist Dr. Mccord and was recently seen in Feb 2022. Pt. with advanced CKD and has prior refused to initiate HD. Most recent Scr on OP labs was elevated at 4.38 on 02/7/22. Scr on admission elevated at 4.6 and BUN of 60.  Today SCr 5.3 & BUN 95. UA done on admission showed hematuria and proteinuria. Pt. failed Torsemide 30 mg PO at home. CXR with improved pulmonary vascular congestion. 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  Volume status with improvement. s/p IV bumex. Continue  Bumex 2mg PO bid. WIll have to tolerate some degree of azotemia to maintain euvolemia. Pt with hx of  BPH,  bladder scan with 75cc PVR. Strict I & O. Monitor labs and urine output. Avoid nephrotoxins. Dose medications as per eGFR. Needs to be OOB. PT eval for return to Assisted living Pt. with advanced CKD stage 5 and fluid overload. Pt. follows Op Nephrologist Dr. Mccord and was recently seen in Feb 2022. Pt. with advanced CKD and has prior refused to initiate HD. Most recent Scr on OP labs was elevated at 4.38 on 02/7/22. Scr on admission elevated at 4.6 and BUN of 60.  Today SCr 5.3 & BUN 95. UA done on admission showed hematuria and proteinuria. Pt. failed Torsemide 30 mg PO at home. CXR with improved pulmonary vascular congestion. 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  Volume status with improvement. s/p IV bumex. Continue  Bumex 2mg PO bid. Will have to tolerate some degree of azotemia to maintain euvolemia. Pt with hx of  BPH,  bladder scan with 75cc PVR. Strict I & O. Monitor labs and urine output. Avoid nephrotoxins. Dose medications as per eGFR. Needs to be OOB. PT eval for return to Assisted living Pt. with advanced CKD stage 5 and fluid overload. Pt. follows Op Nephrologist Dr. Mccord and was recently seen in Feb 2022. Pt. with advanced CKD and has prior refused to initiate HD. Most recent Scr on OP labs was elevated at 4.38 on 02/7/22. Scr on admission elevated at 4.6 and BUN of 60.  Today SCr 5.3 & BUN 95. UA done on admission showed hematuria and proteinuria. Pt. failed Torsemide 30 mg PO at home. CXR with improved pulmonary vascular congestion.. s/p IV bumex.  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     Creatinine unchanged but BUN higher  Volume status with improvement  Continue  Bumex 2mg PO bid.   Will have to tolerate some degree of azotemia to maintain euvolemia.  Please stop aluminium containing antacids given advanced CKD   Pt with hx of  BPH,  bladder scan with 75cc PVR. Strict I & O. Monitor labs and urine output. Avoid nephrotoxins. Dose medications as per eGFR. Needs to be OOB. PT eval for return to Assisted living

## 2022-03-16 NOTE — PROGRESS NOTE ADULT - SUBJECTIVE AND OBJECTIVE BOX
PROGRESS NOTE:         Patient is a 86y old  Male who presents with a chief complaint of volume overload secondary to chronic kidney disease (16 Mar 2022 10:53)      SUBJECTIVE / OVERNIGHT EVENTS:  No acute events overnight. Patient seen and evaluated at bedside.  Denies SOB at rest, chest pain, abdominal pain, nausea/vomiting.    ADDITIONAL REVIEW OF SYSTEMS:    MEDICATIONS  (STANDING):  amLODIPine   Tablet 5 milliGRAM(s) Oral daily  buMETAnide 2 milliGRAM(s) Oral two times a day  cefTRIAXone   IVPB      cefTRIAXone   IVPB 1000 milliGRAM(s) IV Intermittent every 24 hours  dextrose 40% Gel 15 Gram(s) Oral once  dextrose 5%. 1000 milliLiter(s) (50 mL/Hr) IV Continuous <Continuous>  dextrose 5%. 1000 milliLiter(s) (100 mL/Hr) IV Continuous <Continuous>  dextrose 50% Injectable 25 Gram(s) IV Push once  dextrose 50% Injectable 25 Gram(s) IV Push once  dextrose 50% Injectable 12.5 Gram(s) IV Push once  glucagon  Injectable 1 milliGRAM(s) IntraMuscular once  heparin   Injectable 5000 Unit(s) SubCutaneous every 12 hours  insulin lispro (ADMELOG) corrective regimen sliding scale   SubCutaneous three times a day before meals  insulin lispro (ADMELOG) corrective regimen sliding scale   SubCutaneous at bedtime  tamsulosin 0.8 milliGRAM(s) Oral at bedtime    MEDICATIONS  (PRN):  acetaminophen     Tablet .. 650 milliGRAM(s) Oral every 6 hours PRN Temp greater or equal to 38C (100.4F), Mild Pain (1 - 3)  aluminum hydroxide/magnesium hydroxide/simethicone Suspension 30 milliLiter(s) Oral every 4 hours PRN Dyspepsia  loperamide 2 milliGRAM(s) Oral every 6 hours PRN Diarrhea  melatonin 3 milliGRAM(s) Oral at bedtime PRN Insomnia  ondansetron Injectable 4 milliGRAM(s) IV Push every 8 hours PRN Nausea and/or Vomiting      CAPILLARY BLOOD GLUCOSE      POCT Blood Glucose.: 134 mg/dL (16 Mar 2022 08:29)  POCT Blood Glucose.: 130 mg/dL (15 Mar 2022 22:03)  POCT Blood Glucose.: 112 mg/dL (15 Mar 2022 17:24)  POCT Blood Glucose.: 209 mg/dL (15 Mar 2022 12:07)    I&O's Summary    15 Mar 2022 07:01  -  16 Mar 2022 07:00  --------------------------------------------------------  IN: 480 mL / OUT: 970 mL / NET: -490 mL        PHYSICAL EXAM:  Vital Signs Last 24 Hrs  T(C): 36.8 (16 Mar 2022 09:06), Max: 36.8 (15 Mar 2022 20:30)  T(F): 98.2 (16 Mar 2022 09:06), Max: 98.3 (15 Mar 2022 20:30)  HR: 75 (16 Mar 2022 09:06) (75 - 80)  BP: 132/68 (16 Mar 2022 09:06) (122/69 - 145/76)  BP(mean): --  RR: 17 (16 Mar 2022 09:06) (17 - 18)  SpO2: 96% (16 Mar 2022 09:06) (94% - 96%)    CONSTITUTIONAL: NAD  RESPIRATORY: Normal respiratory effort; on RA, speaking in complete sentences; + crackles at lung bases, no wheezes.   CARDIOVASCULAR: Regular rate and rhythm, normal S1 and S2, no murmur/rub/gallop; No lower extremity edema.  ABDOMEN: Soft, nondistended, nontender to palpation, normoactive bowel sounds, no rebound/guarding.  PSYCH: Alert, oriented to self, answers location to "[facility]", answers year to "2002", able to follow verbal commands.     LABS:                        12.5   9.65  )-----------( 200      ( 16 Mar 2022 09:16 )             39.3     03-16    140  |  101  |  96<H>  ----------------------------<  127<H>  3.8   |  21<L>  |  5.37<H>    Ca    9.2      16 Mar 2022 09:16    TPro  7.4  /  Alb  4.1  /  TBili  0.4  /  DBili  x   /  AST  13  /  ALT  24  /  AlkPhos  79  03-16              GI PCR Panel, Stool (collected 14 Mar 2022 13:13)  Source: .Stool Feces  Final Report (14 Mar 2022 17:32):    GI PCR Results: NOT detected    *******Please Note:*******    GI panel PCR evaluates for:    Campylobacter, Plesiomonas shigelloides, Salmonella,    Vibrio, Yersinia enterocolitica, Enteroaggregative    Escherichia coli (EAEC), Enteropathogenic E.coli (EPEC),    Enterotoxigenic E. coli (ETEC) lt/st, Shiga-like    toxin-producing E. coli (STEC) stx1/stx2,    Shigella/ Enteroinvasive E. coli (EIEC), Cryptosporidium,    Cyclospora cayetanensis, Entamoeba histolytica,    Giardia lamblia, Adenovirus F 40/41, Astrovirus,    Norovirus GI/GII, Rotavirus A, Sapovirus    Culture - Stool (collected 14 Mar 2022 13:08)  Source: .Stool Feces  Preliminary Report (15 Mar 2022 10:37):    No enteric pathogens to date: Final culture pending        RADIOLOGY & ADDITIONAL TESTS:  Results Reviewed:   Imaging Personally Reviewed:  Electrocardiogram Personally Reviewed:    COORDINATION OF CARE:  Care Discussed with Consultants/Other Providers [Y/N]:  Prior or Outpatient Records Reviewed [Y/N]:

## 2022-03-17 ENCOUNTER — TRANSCRIPTION ENCOUNTER (OUTPATIENT)
Age: 87
End: 2022-03-17

## 2022-03-17 VITALS
HEART RATE: 83 BPM | TEMPERATURE: 98 F | DIASTOLIC BLOOD PRESSURE: 77 MMHG | SYSTOLIC BLOOD PRESSURE: 139 MMHG | RESPIRATION RATE: 18 BRPM | OXYGEN SATURATION: 95 %

## 2022-03-17 LAB
ANION GAP SERPL CALC-SCNC: 20 MMOL/L — HIGH (ref 5–17)
BUN SERPL-MCNC: 98 MG/DL — HIGH (ref 7–23)
CALCIUM SERPL-MCNC: 9.5 MG/DL — SIGNIFICANT CHANGE UP (ref 8.4–10.5)
CHLORIDE SERPL-SCNC: 100 MMOL/L — SIGNIFICANT CHANGE UP (ref 96–108)
CO2 SERPL-SCNC: 18 MMOL/L — LOW (ref 22–31)
CREAT SERPL-MCNC: 5.74 MG/DL — HIGH (ref 0.5–1.3)
EGFR: 9 ML/MIN/1.73M2 — LOW
GAS PNL BLDV: SIGNIFICANT CHANGE UP
GLUCOSE BLDC GLUCOMTR-MCNC: 114 MG/DL — HIGH (ref 70–99)
GLUCOSE BLDC GLUCOMTR-MCNC: 122 MG/DL — HIGH (ref 70–99)
GLUCOSE BLDC GLUCOMTR-MCNC: 144 MG/DL — HIGH (ref 70–99)
GLUCOSE BLDC GLUCOMTR-MCNC: 169 MG/DL — HIGH (ref 70–99)
GLUCOSE SERPL-MCNC: 135 MG/DL — HIGH (ref 70–99)
MAGNESIUM SERPL-MCNC: 2.5 MG/DL — SIGNIFICANT CHANGE UP (ref 1.6–2.6)
PHOSPHATE SERPL-MCNC: 6.5 MG/DL — HIGH (ref 2.5–4.5)
POTASSIUM SERPL-MCNC: 3.7 MMOL/L — SIGNIFICANT CHANGE UP (ref 3.5–5.3)
POTASSIUM SERPL-SCNC: 3.7 MMOL/L — SIGNIFICANT CHANGE UP (ref 3.5–5.3)
SODIUM SERPL-SCNC: 138 MMOL/L — SIGNIFICANT CHANGE UP (ref 135–145)

## 2022-03-17 PROCEDURE — 99232 SBSQ HOSP IP/OBS MODERATE 35: CPT

## 2022-03-17 RX ORDER — BUMETANIDE 0.25 MG/ML
1 INJECTION INTRAMUSCULAR; INTRAVENOUS
Qty: 60 | Refills: 0
Start: 2022-03-17 | End: 2022-04-15

## 2022-03-17 RX ORDER — TAMSULOSIN HYDROCHLORIDE 0.4 MG/1
2 CAPSULE ORAL
Qty: 60 | Refills: 0
Start: 2022-03-17 | End: 2022-04-15

## 2022-03-17 RX ORDER — CITRIC ACID/SODIUM CITRATE 300-500 MG
15 SOLUTION, ORAL ORAL
Qty: 900 | Refills: 0
Start: 2022-03-17 | End: 2022-04-15

## 2022-03-17 RX ORDER — CITRIC ACID/SODIUM CITRATE 300-500 MG
15 SOLUTION, ORAL ORAL
Refills: 0 | Status: DISCONTINUED | OUTPATIENT
Start: 2022-03-17 | End: 2022-03-18

## 2022-03-17 RX ORDER — LOPERAMIDE HCL 2 MG
1 TABLET ORAL
Qty: 0 | Refills: 0 | DISCHARGE

## 2022-03-17 RX ORDER — CITRIC ACID/SODIUM CITRATE 300-500 MG
30 SOLUTION, ORAL ORAL
Refills: 0 | Status: DISCONTINUED | OUTPATIENT
Start: 2022-03-17 | End: 2022-03-17

## 2022-03-17 RX ORDER — CITRIC ACID/SODIUM CITRATE 300-500 MG
15 SOLUTION, ORAL ORAL
Refills: 0 | Status: DISCONTINUED | OUTPATIENT
Start: 2022-03-17 | End: 2022-03-17

## 2022-03-17 RX ORDER — LOPERAMIDE HCL 2 MG
0 TABLET ORAL
Qty: 0 | Refills: 0 | DISCHARGE

## 2022-03-17 RX ORDER — TAMSULOSIN HYDROCHLORIDE 0.4 MG/1
1 CAPSULE ORAL
Qty: 0 | Refills: 0 | DISCHARGE

## 2022-03-17 RX ADMIN — Medication 650 MILLIGRAM(S): at 17:12

## 2022-03-17 RX ADMIN — AMLODIPINE BESYLATE 5 MILLIGRAM(S): 2.5 TABLET ORAL at 06:00

## 2022-03-17 RX ADMIN — HEPARIN SODIUM 5000 UNIT(S): 5000 INJECTION INTRAVENOUS; SUBCUTANEOUS at 18:02

## 2022-03-17 RX ADMIN — HEPARIN SODIUM 5000 UNIT(S): 5000 INJECTION INTRAVENOUS; SUBCUTANEOUS at 06:00

## 2022-03-17 RX ADMIN — Medication 650 MILLIGRAM(S): at 16:42

## 2022-03-17 RX ADMIN — CEFTRIAXONE 100 MILLIGRAM(S): 500 INJECTION, POWDER, FOR SOLUTION INTRAMUSCULAR; INTRAVENOUS at 12:57

## 2022-03-17 RX ADMIN — Medication 15 MILLILITER(S): at 18:02

## 2022-03-17 RX ADMIN — Medication 3 MILLIGRAM(S): at 21:03

## 2022-03-17 RX ADMIN — BUMETANIDE 2 MILLIGRAM(S): 0.25 INJECTION INTRAMUSCULAR; INTRAVENOUS at 06:00

## 2022-03-17 RX ADMIN — TAMSULOSIN HYDROCHLORIDE 0.8 MILLIGRAM(S): 0.4 CAPSULE ORAL at 21:03

## 2022-03-17 RX ADMIN — BUMETANIDE 2 MILLIGRAM(S): 0.25 INJECTION INTRAMUSCULAR; INTRAVENOUS at 18:02

## 2022-03-17 NOTE — DISCHARGE NOTE NURSING/CASE MANAGEMENT/SOCIAL WORK - NSDCFUADDAPPT_GEN_ALL_CORE_FT
You must follow up with your primary medical doctor within 3-4 days of discharge - please call to make an appointment.  At this appointment, you will need a CBC and BMP drawn to monitor.    You must follow up with your nephrologist within one week of discharge - please call to make an appointment.            APPTS ARE READY TO BE MADE: [ x] YES    Best Family or Patient Contact (if needed):    Additional Information about above appointments (if needed):    1:   2:   3:     Other comments or requests:

## 2022-03-17 NOTE — DISCHARGE NOTE PROVIDER - NSDCMRMEDTOKEN_GEN_ALL_CORE_FT
amLODIPine 5 mg oral tablet: 1 tab(s) orally once a day  azelastine 137 mcg/inh (0.1%) nasal spray: 2 spray(s) nasal 2 times a day, As Needed  bumetanide 2 mg oral tablet: 1 tab(s) orally 2 times a day  Imodium 2 mg oral capsule: 1 cap(s) orally every 8 hours, As Needed - for diarrhea  sodium citrate-citric acid 500 mg-334 mg/5 mL oral solution: 15 milliliter(s) orally 2 times a day  tamsulosin 0.4 mg oral capsule: 2 cap(s) orally once a day (at bedtime)  terbinafine 1% topical cream: Apply topically to affected area 2 times a day   amLODIPine 5 mg oral tablet: 1 tab(s) orally once a day  azelastine 137 mcg/inh (0.1%) nasal spray: 2 spray(s) nasal 2 times a day, As Needed  bumetanide 2 mg oral tablet: 1 tab(s) orally 2 times a day  Imodium 2 mg oral capsule: 1 cap(s) orally every 8 hours, As Needed - for diarrhea  Outpatient Physical Therapy: For strengthening, gait training, and balance  sodium citrate-citric acid 500 mg-334 mg/5 mL oral solution: 15 milliliter(s) orally 2 times a day  tamsulosin 0.4 mg oral capsule: 2 cap(s) orally once a day (at bedtime)  terbinafine 1% topical cream: Apply topically to affected area 2 times a day

## 2022-03-17 NOTE — DISCHARGE NOTE PROVIDER - CARE PROVIDER_API CALL
Bud Crespo)  Family Medicine  [address], [address]  [place] [address]  Phone: [phone]  Fax: [phone]  Follow Up Time:     Emeka Mccord)  Internal Medicine; Nephrology  [address] [place] [id number]  Phone: [phone]  Fax: [phone]  Follow Up Time:

## 2022-03-17 NOTE — DISCHARGE NOTE PROVIDER - HOSPITAL COURSE
86 year old male, with a PMHx of HTN, BPH, and CKD Stage V (deferring dialysis) presenting with volume overload secondary to renal dysfunction.  Chest xray on admission shows bilateral patchy opacities may represent pulmonary edema versus infection; cardiomegaly.      - avoid nephrotoxins; dose meds per eGFR  -echo showed mild to moderate AR. -BNP mildly elevated but improved on repeat.  -Repeat CXR seems improved from admission. -CT chest with incidentally found 6.2cm aortic aneurysm. no pulm edema now. Appears to have had prior asbestos exposure. -Discussed with HCP nephew who agrees with medical management only.   -avoid excessive fluid intake.     Problem/Plan - 2:  ·  Problem: Hypertension.   ·  Plan: - continue home amlodipine 5mg daily  -HbA1c in am for elevated BG - 7.8 consistent with DM.  -OMER QAC/HS for now. However, will likely not send on oral meds at this time given his age.   -RAZIA rushing appreciated.     Problem/Plan - 3:  ·  Problem: History of BPH.   ·  Plan: - continue home tamsulosin increased to 0.8mg qhs.       86 year old male, with a PMHx of HTN, BPH, and CKD Stage V (deferring dialysis) presenting with volume overload secondary to renal dysfunction.  Chest xray on admission shows bilateral patchy opacities may represent pulmonary edema versus infection; cardiomegaly.     Problem/Plan - 1:  ·  Problem: Stage 5 chronic kidney disease not on chronic dialysis.   ·  Plan: Nephrology consulted  - pt declines hemodialysis; will manage volume status with diuretics  - pt takes torsemide 20mg qAM and 10mg qPM at home - was held during admssion, then started on Bumex  - f/u urine lytes, urine urea, and spot urine TP/CR. -proteinuria noted.   - U/A results noted-- patient has no symptoms of UTI. -F/u UCx - showed coag negative staph, -will treat empirically with CTX IV x 3 days.   -s/p Bumex 2mg IV x1 on admission. -s/p Bumex 1mg IV BID for now. -per renal recs, stop iv 2mg bid and change to po 2mg bid.  - bladder scan q8h - not retaining. -strict i's/o's.   -c/w increased home flomax to 0.8mg qhs to help reduce any component due to ?urinary retention.  - obtain abdominal ultrasound to assess for ascites and any renal/uro structural issues. - no hydronephrosis but enlarged prostate.  - strict I/O's  - avoid nephrotoxins; dose meds per eGFR  -echo showed mild to moderate AR. -BNP mildly elevated but improved on repeat.  -Repeat CXR seems improved from admission. -CT chest with incidentally found 6.2cm aortic aneurysm. no pulm edema now. Appears to have had prior asbestos exposure. -Discussed with HCP nephew who agrees with medical management only.   -avoid excessive fluid intake.     Problem/Plan - 2:  ·  Problem: Hypertension.   ·  Plan: - continue home amlodipine 5mg daily  -HbA1c in am for elevated BG - 7.8 consistent with DM.  -OMER QAC/HS for now. However, will likely not send on oral meds at this time given his age.   -RAZIA rushing appreciated.     Problem/Plan - 3:  ·  Problem: History of BPH.   ·  Plan: - continue home tamsulosin increased to 0.8mg qhs.      Patient cleared for discharge, by Dr. Dong, with PCP and Nephrology follow up on discharge. 86 year old male, with a PMHx of HTN, BPH, and CKD Stage V (deferring dialysis) presenting with volume overload secondary to renal dysfunction.  Chest xray on admission shows bilateral patchy opacities may represent pulmonary edema versus infection; cardiomegaly.     Problem/Plan - 1:  ·  Problem: Stage 5 chronic kidney disease not on chronic dialysis.   ·  Plan: Nephrology consulted  - pt declines hemodialysis; will manage volume status with diuretics  - pt takes torsemide 20mg qAM and 10mg qPM at home - was held during admssion, then started on Bumex  - f/u urine lytes, urine urea, and spot urine TP/CR. -proteinuria noted.   - U/A results noted-- patient has no symptoms of UTI. -F/u UCx - showed coag negative staph, -will treat empirically with CTX IV x 3 days.   -s/p Bumex 2mg IV x1 on admission. -s/p Bumex 1mg IV BID for now. -per renal recs, stop iv 2mg bid and change to po 2mg bid.  - bladder scan q8h - not retaining. -strict i's/o's.   -c/w increased home flomax to 0.8mg qhs to help reduce any component due to ?urinary retention.  - obtain abdominal ultrasound to assess for ascites and any renal/uro structural issues. - no hydronephrosis but enlarged prostate.  - strict I/O's  - avoid nephrotoxins; dose meds per eGFR  -echo showed mild to moderate AR. -BNP mildly elevated but improved on repeat.  -Repeat CXR seems improved from admission. -CT chest with incidentally found 6.2cm aortic aneurysm. no pulm edema now. Appears to have had prior asbestos exposure. -Discussed with HCP nephew who agrees with medical management only.   -avoid excessive fluid intake.     Problem/Plan - 2:  ·  Problem: Hypertension.   ·  Plan: - continue home amlodipine 5mg daily    Problem/Plan - 3:  -HbA1c in am for elevated BG - 7.8 consistent with DM.  -OMER QAC/HS for now. However, will likely not send on oral meds at this time given his age.   -RAZIA rushing appreciated.     Problem/Plan - 4:  ·  Problem: History of BPH.   ·  Plan: - continue home tamsulosin increased to 0.8mg qhs.      Patient cleared for discharge, by Dr. Dong, with PCP and Nephrology follow up on discharge.

## 2022-03-17 NOTE — DISCHARGE NOTE PROVIDER - NSDCCPCAREPLAN_GEN_ALL_CORE_FT
PRINCIPAL DISCHARGE DIAGNOSIS  Diagnosis: Shortness of breath  Assessment and Plan of Treatment: Resolved  You must follow up with your primary medical doctor within 3-4 days of discharge - please call to make an appointment.      SECONDARY DISCHARGE DIAGNOSES  Diagnosis: Renal failure, acute  Assessment and Plan of Treatment: You must follow up with your nephrologist within one week of discharge - please call to make this appointment.  At this appointment, you will need your creatinine level checked to monitor.  Avoid taking (NSAIDs) - (ex: Ibuprofen, Advil, Celebrex, Naprosyn)  Avoid taking any nephrotoxic agents (can harm kidneys) - Intravenous contrast for diagnostic testing, combination cold medications.  Have all medications adjusted for your renal function by your Health Care Provider.  Blood pressure control is important.  Take all medication as prescribed.      Diagnosis: Stage 5 chronic kidney disease not on chronic dialysis  Assessment and Plan of Treatment: Follow up with your nephrologist within one week of discharge.    Diagnosis: Anemia secondary to renal failure  Assessment and Plan of Treatment: Stable  When you follow up with your primary medical doctor, you will need to have a CBC drawn to monitor.    Diagnosis: Hypertension  Assessment and Plan of Treatment: Low salt diet  Activity as tolerated.  Take all medication as prescribed.  Follow up with your medical doctor for routine blood pressure monitoring at your next visit.  Notify your doctor if you have any of the following symptoms:   Dizziness, Lightheadedness, Blurry vision, Headache, Chest pain, Shortness of breath      Diagnosis: History of BPH  Assessment and Plan of Treatment: Continue with current dose of flomax  Follow up with your urologist upon discharge.     PRINCIPAL DISCHARGE DIAGNOSIS  Diagnosis: Shortness of breath  Assessment and Plan of Treatment: Resolved  You must follow up with your primary medical doctor within 3-4 days of discharge - please call to make an appointment.      SECONDARY DISCHARGE DIAGNOSES  Diagnosis: Renal failure, acute  Assessment and Plan of Treatment: You must follow up with your nephrologist within one week of discharge - please call to make this appointment.  At this appointment, you will need your creatinine level checked to monitor.  Avoid taking (NSAIDs) - (ex: Ibuprofen, Advil, Celebrex, Naprosyn)  Avoid taking any nephrotoxic agents (can harm kidneys) - Intravenous contrast for diagnostic testing, combination cold medications.  Have all medications adjusted for your renal function by your Health Care Provider.  Blood pressure control is important.  Take all medication as prescribed.      Diagnosis: Stage 5 chronic kidney disease not on chronic dialysis  Assessment and Plan of Treatment: Follow up with your nephrologist within one week of discharge.    Diagnosis: Anemia secondary to renal failure  Assessment and Plan of Treatment: Stable  When you follow up with your primary medical doctor, you will need to have a CBC drawn to monitor.    Diagnosis: Hypertension  Assessment and Plan of Treatment: Low salt diet  Activity as tolerated.  Take all medication as prescribed.  Follow up with your medical doctor for routine blood pressure monitoring at your next visit.  Notify your doctor if you have any of the following symptoms:   Dizziness, Lightheadedness, Blurry vision, Headache, Chest pain, Shortness of breath      Diagnosis: History of BPH  Assessment and Plan of Treatment: Continue with current dose of flomax  Follow up with your urologist upon discharge.    Diagnosis: Diabetes  Assessment and Plan of Treatment: Make sure you get your HgA1c checked every three months.  If you take oral diabetes medications, check your blood glucose two times a day.  If you take insulin, check your blood glucose before meals and at bedtime.  It's important not to skip any meals.  Keep a log of your blood glucose results and always take it with you to your doctor appointments.  Keep a list of your current medications including injectables and over the counter medications and bring this medication list with you to all your doctor appointments.  If you have not seen your ophthalmologist this year call for appointment.  Check your feet daily for redness, sores, or openings. Do not self treat. If no improvement in two days call your primary care physician for an appointment.  Low blood sugar (hypoglycemia) is a blood sugar below 70mg/dl. Check your blood sugar if you feel signs/symptoms of hypoglycemia. If your blood sugar is below 70 take 15 grams of carbohydrates (ex 4 oz of apple juice, 3-4 glucose tablets, or 4-6 oz of regular soda) wait 15 minutes and repeat blood sugar to make sure it comes up above 70.  If your blood sugar is above 70 and you are due for a meal, have a meal.  If you are not due for a meal have a snack.  This snack helps keeps your blood sugar at a safe range.

## 2022-03-17 NOTE — PROGRESS NOTE ADULT - SUBJECTIVE AND OBJECTIVE BOX
Patient is a 86y old  Male who presents with a chief complaint of volume overload secondary to chronic kidney disease (17 Mar 2022 11:43)        SUBJECTIVE / OVERNIGHT EVENTS: patient denies any n/v or breathing problems or appetite changes or coughing. feels ok.      MEDICATIONS  (STANDING):  amLODIPine   Tablet 5 milliGRAM(s) Oral daily  buMETAnide 2 milliGRAM(s) Oral two times a day  cefTRIAXone   IVPB      cefTRIAXone   IVPB 1000 milliGRAM(s) IV Intermittent every 24 hours  citric acid/sodium citrate Solution 15 milliLiter(s) Oral two times a day  dextrose 40% Gel 15 Gram(s) Oral once  dextrose 5%. 1000 milliLiter(s) (50 mL/Hr) IV Continuous <Continuous>  dextrose 5%. 1000 milliLiter(s) (100 mL/Hr) IV Continuous <Continuous>  dextrose 50% Injectable 25 Gram(s) IV Push once  dextrose 50% Injectable 12.5 Gram(s) IV Push once  dextrose 50% Injectable 25 Gram(s) IV Push once  glucagon  Injectable 1 milliGRAM(s) IntraMuscular once  heparin   Injectable 5000 Unit(s) SubCutaneous every 12 hours  insulin lispro (ADMELOG) corrective regimen sliding scale   SubCutaneous three times a day before meals  insulin lispro (ADMELOG) corrective regimen sliding scale   SubCutaneous at bedtime  tamsulosin 0.8 milliGRAM(s) Oral at bedtime    MEDICATIONS  (PRN):  acetaminophen     Tablet .. 650 milliGRAM(s) Oral every 6 hours PRN Temp greater or equal to 38C (100.4F), Mild Pain (1 - 3)  loperamide 2 milliGRAM(s) Oral every 6 hours PRN Diarrhea  melatonin 3 milliGRAM(s) Oral at bedtime PRN Insomnia  ondansetron Injectable 4 milliGRAM(s) IV Push every 8 hours PRN Nausea and/or Vomiting      Vital Signs Last 24 Hrs  T(C): 36.6 (17 Mar 2022 14:08), Max: 36.9 (16 Mar 2022 20:56)  T(F): 97.9 (17 Mar 2022 14:08), Max: 98.4 (16 Mar 2022 20:56)  HR: 84 (17 Mar 2022 14:08) (76 - 84)  BP: 120/65 (17 Mar 2022 14:08) (120/65 - 137/72)  BP(mean): --  RR: 18 (17 Mar 2022 14:08) (18 - 20)  SpO2: 94% (17 Mar 2022 14:08) (93% - 96%)  CAPILLARY BLOOD GLUCOSE      POCT Blood Glucose.: 144 mg/dL (17 Mar 2022 13:30)  POCT Blood Glucose.: 114 mg/dL (17 Mar 2022 08:33)  POCT Blood Glucose.: 174 mg/dL (16 Mar 2022 22:05)  POCT Blood Glucose.: 135 mg/dL (16 Mar 2022 17:32)    I&O's Summary    16 Mar 2022 07:01  -  17 Mar 2022 07:00  --------------------------------------------------------  IN: 720 mL / OUT: 1050 mL / NET: -330 mL    17 Mar 2022 07:01  -  17 Mar 2022 16:59  --------------------------------------------------------  IN: 480 mL / OUT: 400 mL / NET: 80 mL          PHYSICAL EXAM:   GENERAL: NAD, well-developed  HEAD:  Atraumatic, Normocephalic  EYES:   conjunctiva and sclera clear  NECK: Supple,    CHEST/LUNG: Clear to auscultation bilaterally; No wheeze  HEART: S1S2 normal. Regular rate and rhythm; No murmurs, rubs, or gallops  ABDOMEN: Soft, Nontender, Nondistended; Bowel sounds present  EXTREMITIES:   No clubbing, cyanosis, or edema  PSYCH/Neuro: Awake and answers questions and follows commands appropriately.   SKIN: No rashes or lesions      LABS:                        12.5   9.65  )-----------( 200      ( 16 Mar 2022 09:16 )             39.3     03-17    138  |  100  |  98<H>  ----------------------------<  135<H>  3.7   |  18<L>  |  5.74<H>    Ca    9.5      17 Mar 2022 06:45  Phos  6.5     03-17  Mg     2.5     03-17    TPro  7.4  /  Alb  4.1  /  TBili  0.4  /  DBili  x   /  AST  13  /  ALT  24  /  AlkPhos  79  03-16            RADIOLOGY & ADDITIONAL TESTS:    Imaging Personally Reviewed:  Consultant(s) Notes Reviewed:  renal  Care Discussed with Consultants/Other Providers: Dr. Willoughby

## 2022-03-17 NOTE — DISCHARGE NOTE PROVIDER - NSDCFUADDAPPT_GEN_ALL_CORE_FT
You must follow up with your primary medical doctor within 3-4 days of discharge - please call to make an appointment.  At this appointment, you will need a CBC and BMP drawn to monitor.    You must follow up with your nephrologist within one week of discharge - please call to make an appointment.            APPTS ARE READY TO BE MADE: [ x] YES    Best Family or Patient Contact (if needed):    Additional Information about above appointments (if needed):    1:   2:   3:     Other comments or requests:    You must follow up with your primary medical doctor within 3-4 days of discharge - please call to make an appointment.  At this appointment, you will need a CBC and BMP drawn to monitor.    You must follow up with your nephrologist within one week of discharge - please call to make an appointment.    Patient was previously scheduled with [facility] on 03/30/2022 03:30 PM at [address] [place] [id number].    Patient was provided with ([facility] [address] [place] [address], [address] ) and was advised to call to schedule follow up within specified time frame. At this time patient declined scheduling assistance.            APPTS ARE READY TO BE MADE: [ x] YES    Best Family or Patient Contact (if needed):    Additional Information about above appointments (if needed):    1:   2:   3:     Other comments or requests:

## 2022-03-17 NOTE — DISCHARGE NOTE NURSING/CASE MANAGEMENT/SOCIAL WORK - PATIENT PORTAL LINK FT
You can access the FollowMyHealth Patient Portal offered by [facility] by registering at the following website: [email or web address]. By joining [facility]’s FollowMyHealth portal, you will also be able to view your health information using other applications (apps) compatible with our system.

## 2022-03-17 NOTE — PROGRESS NOTE ADULT - PROBLEM SELECTOR PLAN 1
See by nephrology fellow in the ED, appreciate recommendations  - pt declines hemodialysis; will manage volume status with diuretics  - pt takes torsemide 20mg qAM and 10mg qPM. - holding now.   - f/u urine lytes, urine urea, and spot urine TP/CR. -proteinuria noted. F/u renal recs.  - U/A results noted-- patient has no symptoms of UTI. -F/u UCx - showed coag negative staph, -will treat empirically with CTX IV x 3 days - completed.   -s/p Bumex 2mg IV x1 on admission. -s/p Bumex 1mg IV BID for now. -per renal recs, stopped iv 2mg bid and changed to po 2mg bid.  - bladder scan q8h - not retaining. -strict i's/o's.   -c/w increased home flomax to 0.8mg qhs to help reduce any component due to ?urinary retention.  - obtain abdominal ultrasound to assess for ascites and any renal/uro structural issues. - no hydronephrosis but enlarged prostate.  - strict I/O's  - avoid nephrotoxins; dose meds per eGFR  -renal recs appreciated.   -echo showed mild to moderate AR. -BNP mildly elevated but improved on repeat.  -stool studies in any diarrhea - negative.  -monitor bmp, mg, phos, vbg.  -Cr worse again today.  -Repeat CXR seems improved from admission. -CT chest with incidentally found 6.2cm aortic aneurysm. no pulm edema now. Appears to have had prior asbestos exposure. -Discussed with HCP nephew who agrees with medical management only.   -will check CRP, ESR, PCT - f/u - somewhat abnormal but nonspecific.   -avoid excessive fluid intake.  -VBG with metabolic acidosis. -Will start Bicitra 15mL BID per renal. See by nephrology fellow in the ED, appreciate recommendations  - pt declines hemodialysis; will manage volume status with diuretics  - pt takes torsemide 20mg qAM and 10mg qPM. - holding now.   - f/u urine lytes, urine urea, and spot urine TP/CR. -proteinuria noted. F/u renal recs.  - U/A results noted-- patient has no symptoms of UTI. -F/u UCx - showed coag negative staph, -will treat empirically with CTX IV x 3 days - completed.   -s/p Bumex 2mg IV x1 on admission. -s/p Bumex 1mg IV BID for now. -per renal recs, stopped iv 2mg bid and changed to po 2mg bid.  - bladder scan q8h - not retaining. -strict i's/o's.   -c/w increased home flomax to 0.8mg qhs to help reduce any component due to ?urinary retention.  - obtain abdominal ultrasound to assess for ascites and any renal/uro structural issues. - no hydronephrosis but enlarged prostate.  - strict I/O's  - avoid nephrotoxins; dose meds per eGFR  -renal recs appreciated.   -echo showed mild to moderate AR. -BNP mildly elevated but improved on repeat.  -stool studies in any diarrhea - negative.  -monitor bmp, mg, phos, vbg.  -Cr/BUN worse again today, but no uremic encephalopathy.  -Repeat CXR seems improved from admission. -CT chest with incidentally found 6.2cm aortic aneurysm. no pulm edema now. Appears to have had prior asbestos exposure. -Discussed with HCP nephew who agrees with medical management only.   -will check CRP, ESR, PCT - f/u - somewhat abnormal but nonspecific.   -avoid excessive fluid intake.  -VBG with metabolic acidosis. -Will start Bicitra 15mL BID per renal.

## 2022-03-17 NOTE — DISCHARGE NOTE NURSING/CASE MANAGEMENT/SOCIAL WORK - NSDCPEFALRISK_GEN_ALL_CORE
For information on Fall & Injury Prevention, visit: [email or web address] OR  [email or web address] OR  https://www.cdc.gov/steadi/patient.html

## 2022-03-17 NOTE — PROGRESS NOTE ADULT - PROBLEM SELECTOR PLAN 4
- DVT ppx: heparin subq  - GI ppx: none  - Diet: renal, dash  - Code status: DNR/DNI-- confirmed at the time of admission  - HCP: Reilly Chawla (c: [phone]/h: [phone]), nephew. -updated him on 3/15.   -PT eval recs home PT. -He lives in assisted living.    5. Discussed with Dr. Willoughby. Ok for DC today with outpatient renal/pmd follow up. -Discussed with CM. -PT f/u. -Discussed with ANGY Varma.

## 2022-03-18 PROCEDURE — 84300 ASSAY OF URINE SODIUM: CPT

## 2022-03-18 PROCEDURE — 36415 COLL VENOUS BLD VENIPUNCTURE: CPT

## 2022-03-18 PROCEDURE — 85014 HEMATOCRIT: CPT

## 2022-03-18 PROCEDURE — 99232 SBSQ HOSP IP/OBS MODERATE 35: CPT | Mod: GC

## 2022-03-18 PROCEDURE — 82435 ASSAY OF BLOOD CHLORIDE: CPT

## 2022-03-18 PROCEDURE — 85610 PROTHROMBIN TIME: CPT

## 2022-03-18 PROCEDURE — 85027 COMPLETE CBC AUTOMATED: CPT

## 2022-03-18 PROCEDURE — 85730 THROMBOPLASTIN TIME PARTIAL: CPT

## 2022-03-18 PROCEDURE — 93005 ELECTROCARDIOGRAM TRACING: CPT

## 2022-03-18 PROCEDURE — 85025 COMPLETE CBC W/AUTO DIFF WBC: CPT

## 2022-03-18 PROCEDURE — U0005: CPT

## 2022-03-18 PROCEDURE — 84132 ASSAY OF SERUM POTASSIUM: CPT

## 2022-03-18 PROCEDURE — 84550 ASSAY OF BLOOD/URIC ACID: CPT

## 2022-03-18 PROCEDURE — 83735 ASSAY OF MAGNESIUM: CPT

## 2022-03-18 PROCEDURE — 80048 BASIC METABOLIC PNL TOTAL CA: CPT

## 2022-03-18 PROCEDURE — 83880 ASSAY OF NATRIURETIC PEPTIDE: CPT

## 2022-03-18 PROCEDURE — 84484 ASSAY OF TROPONIN QUANT: CPT

## 2022-03-18 PROCEDURE — 82330 ASSAY OF CALCIUM: CPT

## 2022-03-18 PROCEDURE — 76705 ECHO EXAM OF ABDOMEN: CPT

## 2022-03-18 PROCEDURE — 87046 STOOL CULTR AEROBIC BACT EA: CPT

## 2022-03-18 PROCEDURE — 93306 TTE W/DOPPLER COMPLETE: CPT

## 2022-03-18 PROCEDURE — 81001 URINALYSIS AUTO W/SCOPE: CPT

## 2022-03-18 PROCEDURE — 87507 IADNA-DNA/RNA PROBE TQ 12-25: CPT

## 2022-03-18 PROCEDURE — 76770 US EXAM ABDO BACK WALL COMP: CPT

## 2022-03-18 PROCEDURE — 83036 HEMOGLOBIN GLYCOSYLATED A1C: CPT

## 2022-03-18 PROCEDURE — 83605 ASSAY OF LACTIC ACID: CPT

## 2022-03-18 PROCEDURE — 80053 COMPREHEN METABOLIC PANEL: CPT

## 2022-03-18 PROCEDURE — 99239 HOSP IP/OBS DSCHRG MGMT >30: CPT

## 2022-03-18 PROCEDURE — 85652 RBC SED RATE AUTOMATED: CPT

## 2022-03-18 PROCEDURE — 84100 ASSAY OF PHOSPHORUS: CPT

## 2022-03-18 PROCEDURE — 71250 CT THORAX DX C-: CPT

## 2022-03-18 PROCEDURE — 87045 FECES CULTURE AEROBIC BACT: CPT

## 2022-03-18 PROCEDURE — 82340 ASSAY OF CALCIUM IN URINE: CPT

## 2022-03-18 PROCEDURE — 87086 URINE CULTURE/COLONY COUNT: CPT

## 2022-03-18 PROCEDURE — 82570 ASSAY OF URINE CREATININE: CPT

## 2022-03-18 PROCEDURE — U0003: CPT

## 2022-03-18 PROCEDURE — 83930 ASSAY OF BLOOD OSMOLALITY: CPT

## 2022-03-18 PROCEDURE — 84540 ASSAY OF URINE/UREA-N: CPT

## 2022-03-18 PROCEDURE — 84145 PROCALCITONIN (PCT): CPT

## 2022-03-18 PROCEDURE — 97530 THERAPEUTIC ACTIVITIES: CPT

## 2022-03-18 PROCEDURE — 71045 X-RAY EXAM CHEST 1 VIEW: CPT

## 2022-03-18 PROCEDURE — 99285 EMERGENCY DEPT VISIT HI MDM: CPT

## 2022-03-18 PROCEDURE — 82947 ASSAY GLUCOSE BLOOD QUANT: CPT

## 2022-03-18 PROCEDURE — 84156 ASSAY OF PROTEIN URINE: CPT

## 2022-03-18 PROCEDURE — 83935 ASSAY OF URINE OSMOLALITY: CPT

## 2022-03-18 PROCEDURE — 82803 BLOOD GASES ANY COMBINATION: CPT

## 2022-03-18 PROCEDURE — 82043 UR ALBUMIN QUANTITATIVE: CPT

## 2022-03-18 PROCEDURE — 82565 ASSAY OF CREATININE: CPT

## 2022-03-18 PROCEDURE — 84295 ASSAY OF SERUM SODIUM: CPT

## 2022-03-18 PROCEDURE — 97162 PT EVAL MOD COMPLEX 30 MIN: CPT

## 2022-03-18 PROCEDURE — 82962 GLUCOSE BLOOD TEST: CPT

## 2022-03-18 PROCEDURE — 36000 PLACE NEEDLE IN VEIN: CPT

## 2022-03-18 PROCEDURE — 84133 ASSAY OF URINE POTASSIUM: CPT

## 2022-03-18 PROCEDURE — 86140 C-REACTIVE PROTEIN: CPT

## 2022-03-18 PROCEDURE — 97116 GAIT TRAINING THERAPY: CPT

## 2022-03-18 PROCEDURE — 85018 HEMOGLOBIN: CPT

## 2022-03-18 RX ORDER — CITRIC ACID/SODIUM CITRATE 300-500 MG
15 SOLUTION, ORAL ORAL
Qty: 900 | Refills: 0
Start: 2022-03-18 | End: 2022-04-16

## 2022-03-18 RX ORDER — BUMETANIDE 0.25 MG/ML
1 INJECTION INTRAMUSCULAR; INTRAVENOUS
Qty: 60 | Refills: 0
Start: 2022-03-18 | End: 2022-04-16

## 2022-03-18 RX ORDER — TAMSULOSIN HYDROCHLORIDE 0.4 MG/1
2 CAPSULE ORAL
Qty: 60 | Refills: 0
Start: 2022-03-18 | End: 2022-04-16

## 2022-03-18 NOTE — PROGRESS NOTE ADULT - PROBLEM SELECTOR PROBLEM 4
Prophylactic measure
Prophylactic measure
Anemia secondary to renal failure
Prophylactic measure
Anemia secondary to renal failure
Prophylactic measure
Prophylactic measure

## 2022-03-18 NOTE — PROGRESS NOTE ADULT - PROVIDER SPECIALTY LIST ADULT
Hospitalist
Internal Medicine
Nephrology

## 2022-03-18 NOTE — PROGRESS NOTE ADULT - SUBJECTIVE AND OBJECTIVE BOX
[facility] Division of Kidney Diseases & Hypertension  FOLLOW UP NOTE  [phone]--------------------------------------------------------------------------------  Chief Complaint:Shortness of breath        24 hour events/subjective: Patient seen & examined. Labs & vitals reviewed.  Voices no complaints. UOP 1.6L with net -600 mL in 24 hrs. SOB better today. 96% on RA        PAST HISTORY  --------------------------------------------------------------------------------  No significant changes to PMH, PSH, FHx, SHx, unless otherwise noted    ALLERGIES & MEDICATIONS  --------------------------------------------------------------------------------  Allergies    No Known Allergies    Intolerances      Standing Inpatient Medications  amLODIPine   Tablet 5 milliGRAM(s) Oral daily  buMETAnide 2 milliGRAM(s) Oral two times a day  citric acid/sodium citrate Solution 15 milliLiter(s) Oral two times a day  dextrose 40% Gel 15 Gram(s) Oral once  dextrose 5%. 1000 milliLiter(s) IV Continuous <Continuous>  dextrose 5%. 1000 milliLiter(s) IV Continuous <Continuous>  dextrose 50% Injectable 25 Gram(s) IV Push once  dextrose 50% Injectable 12.5 Gram(s) IV Push once  dextrose 50% Injectable 25 Gram(s) IV Push once  glucagon  Injectable 1 milliGRAM(s) IntraMuscular once  heparin   Injectable 5000 Unit(s) SubCutaneous every 12 hours  insulin lispro (ADMELOG) corrective regimen sliding scale   SubCutaneous at bedtime  insulin lispro (ADMELOG) corrective regimen sliding scale   SubCutaneous three times a day before meals  tamsulosin 0.8 milliGRAM(s) Oral at bedtime    PRN Inpatient Medications  acetaminophen     Tablet .. 650 milliGRAM(s) Oral every 6 hours PRN  loperamide 2 milliGRAM(s) Oral every 6 hours PRN  melatonin 3 milliGRAM(s) Oral at bedtime PRN  ondansetron Injectable 4 milliGRAM(s) IV Push every 8 hours PRN      REVIEW OF SYSTEMS  --------------------------------------------------------------------------------  Gen: No  fevers/chills  Respiratory: No dyspnea, cough  CV: No chest pain  GI: No abdominal pain, diarrhea,  nausea, vomiting  : No increased frequency, dysuria, hematuria  MSK:  + edema  Neuro: No dizziness/lightheadedness      All other systems were reviewed and are negative, except as noted.    VITALS/PHYSICAL EXAM  --------------------------------------------------------------------------------  T(C): 36.8 (03-17-22 @ 20:54), Max: 36.8 (03-17-22 @ 20:54)  HR: 83 (03-17-22 @ 20:54) (83 - 84)  BP: 139/77 (03-17-22 @ 20:54) (120/65 - 139/77)  RR: 18 (03-17-22 @ 20:54) (18 - 18)  SpO2: 95% (03-17-22 @ 20:54) (94% - 95%)  Wt(kg): --        03-17-22 @ 07:01  -  03-18-22 @ 07:00  --------------------------------------------------------  IN: 1080 mL / OUT: 1625 mL / NET: -545 mL      Physical Exam:  	Gen: elderly obese man, NAD, on NC  	HEENT: Anicteric  	Pulm:  rales bilaterally  	CV: S1S2+  	Abd: Soft, +BS, obese abdomen  	Ext: LE edema B/L+  	Neuro: Awake, confused  	Skin: Warm and dry        LABS/STUDIES  --------------------------------------------------------------------------------    138  |  100  |  98  ----------------------------<  135      [03-17-22 @ 06:45]  3.7   |  18  |  5.74        Ca     9.5     [03-17-22 @ 06:45]      Mg     2.5     [03-17-22 @ 06:45]      Phos  6.5     [03-17-22 @ 06:45]            Creatinine Trend:  SCr 5.74 [03-17 @ 06:45]  SCr 5.37 [03-16 @ 09:16]  SCr 5.44 [03-15 @ 08:54]  SCr 5.21 [03-14 @ 10:11]  SCr 4.74 [03-13 @ 07:04]    Urinalysis - [03-11-22 @ 18:58]      Color Light Yellow / Appearance Clear / SG 1.015 / pH 6.0      Gluc Trace / Ketone Negative  / Bili Negative / Urobili Negative       Blood Small / Protein 100 mg/dL / Leuk Est Moderate / Nitrite Positive      RBC 4 / WBC 29 / Hyaline 1 / Gran  / Sq Epi  / Non Sq Epi 0 / Bacteria Few    Urine Creatinine 76      [03-12-22 @ 17:19]  Urine Protein 133      [03-13-22 @ 00:13]  Urine Sodium 63      [03-12-22 @ 17:19]  Urine Urea Nitrogen 502      [03-12-22 @ 04:19]  Urine Potassium 27      [03-11-22 @ 18:58]  Urine Osmolality 385      [03-11-22 @ 18:58]

## 2022-03-18 NOTE — PROGRESS NOTE ADULT - PROBLEM SELECTOR PLAN 1
Seen by nephrology fellow in the ED, appreciate recommendations  - pt declines hemodialysis; will manage volume status with diuretics  - pt takes torsemide 20mg qAM and 10mg qPM. - holding now.   - f/u urine lytes, urine urea, and spot urine TP/CR. -proteinuria noted. F/u renal recs.  - U/A results noted-- patient has no symptoms of UTI. -F/u UCx - showed coag negative staph, -will treat empirically with CTX IV x 3 days - completed.   -s/p Bumex 2mg IV x1 on admission. -s/p Bumex 1mg IV BID for now. -per renal recs, stopped iv 2mg bid and changed to po 2mg bid.  - bladder scan q8h - not retaining. -strict i's/o's.   -c/w increased home flomax to 0.8mg qhs to help reduce any component due to ?urinary retention.  - obtain abdominal ultrasound to assess for ascites and any renal/uro structural issues. - no hydronephrosis but enlarged prostate.  - strict I/O's  - avoid nephrotoxins; dose meds per eGFR  -renal recs appreciated.   -echo showed mild to moderate AR. -BNP mildly elevated but improved on repeat.  -stool studies in any diarrhea - negative.  -monitor bmp, mg, phos, vbg.  -Cr/BUN worse again today, but no uremic encephalopathy.  -Repeat CXR seems improved from admission. -CT chest with incidentally found 6.2cm aortic aneurysm. no pulm edema now. Appears to have had prior asbestos exposure. -Discussed with HCP nephew who agreed with medical management only.   -will check CRP, ESR, PCT - f/u - somewhat abnormal but nonspecific.   -avoid excessive fluid intake.  -VBG with metabolic acidosis. -Will c/w Bicitra 15mL BID per renal.

## 2022-03-18 NOTE — PROGRESS NOTE ADULT - PROBLEM SELECTOR PLAN 4
Hg at goal  f/u CBC                If you have any questions, please feel free to contact me  Annalisa Bose  Nephrology Fellow  Pager NS: [phone]/ [facility]: [phone]    (After 5 pm or on weekends please page the on-call fellow, can check [email or web address] for schedule. Login is vernell sheffield, schedule under [facility] medicine, psych, derm)

## 2022-03-18 NOTE — PROGRESS NOTE ADULT - SUBJECTIVE AND OBJECTIVE BOX
Patient is a 86y old  Male who presents with a chief complaint of volume overload secondary to chronic kidney disease (18 Mar 2022 10:23)        SUBJECTIVE / OVERNIGHT EVENTS: feels ok but wants to go home.       MEDICATIONS  (STANDING):  amLODIPine   Tablet 5 milliGRAM(s) Oral daily  buMETAnide 2 milliGRAM(s) Oral two times a day  citric acid/sodium citrate Solution 15 milliLiter(s) Oral two times a day  dextrose 40% Gel 15 Gram(s) Oral once  dextrose 5%. 1000 milliLiter(s) (50 mL/Hr) IV Continuous <Continuous>  dextrose 5%. 1000 milliLiter(s) (100 mL/Hr) IV Continuous <Continuous>  dextrose 50% Injectable 25 Gram(s) IV Push once  dextrose 50% Injectable 12.5 Gram(s) IV Push once  dextrose 50% Injectable 25 Gram(s) IV Push once  glucagon  Injectable 1 milliGRAM(s) IntraMuscular once  heparin   Injectable 5000 Unit(s) SubCutaneous every 12 hours  insulin lispro (ADMELOG) corrective regimen sliding scale   SubCutaneous at bedtime  insulin lispro (ADMELOG) corrective regimen sliding scale   SubCutaneous three times a day before meals  tamsulosin 0.8 milliGRAM(s) Oral at bedtime    MEDICATIONS  (PRN):  acetaminophen     Tablet .. 650 milliGRAM(s) Oral every 6 hours PRN Temp greater or equal to 38C (100.4F), Mild Pain (1 - 3)  loperamide 2 milliGRAM(s) Oral every 6 hours PRN Diarrhea  melatonin 3 milliGRAM(s) Oral at bedtime PRN Insomnia  ondansetron Injectable 4 milliGRAM(s) IV Push every 8 hours PRN Nausea and/or Vomiting      Vital Signs Last 24 Hrs  T(C): 36.8 (17 Mar 2022 20:54), Max: 36.8 (17 Mar 2022 20:54)  T(F): 98.2 (17 Mar 2022 20:54), Max: 98.2 (17 Mar 2022 20:54)  HR: 83 (17 Mar 2022 20:54) (83 - 84)  BP: 139/77 (17 Mar 2022 20:54) (120/65 - 139/77)  BP(mean): --  RR: 18 (17 Mar 2022 20:54) (18 - 18)  SpO2: 95% (17 Mar 2022 20:54) (94% - 95%)  CAPILLARY BLOOD GLUCOSE      POCT Blood Glucose.: 169 mg/dL (17 Mar 2022 21:04)  POCT Blood Glucose.: 122 mg/dL (17 Mar 2022 17:26)  POCT Blood Glucose.: 144 mg/dL (17 Mar 2022 13:30)    I&O's Summary    17 Mar 2022 07:01  -  18 Mar 2022 07:00  --------------------------------------------------------  IN: 1080 mL / OUT: 1625 mL / NET: -545 mL          PHYSICAL EXAM:   GENERAL: NAD, well-developed  HEAD:  Atraumatic, Normocephalic  EYES:  A, conjunctiva and sclera clear  NECK: Supple,    CHEST/LUNG: Clear to auscultation bilaterally; No wheeze  HEART: S1S2 normal. Regular rate and rhythm; No murmurs, rubs, or gallops  ABDOMEN: Soft, Nontender, Nondistended; Bowel sounds present  EXTREMITIES:   , No clubbing, cyanosis, or edema  PSYCH/Neuro: AAOx3. Non-focal.   SKIN: No rashes or lesions      LABS:    03-17    138  |  100  |  98<H>  ----------------------------<  135<H>  3.7   |  18<L>  |  5.74<H>    Ca    9.5      17 Mar 2022 06:45  Phos  6.5     03-17  Mg     2.5     03-17            RADIOLOGY & ADDITIONAL TESTS:    Imaging Personally Reviewed:  Consultant(s) Notes Reviewed:    Care Discussed with Consultants/Other Providers:

## 2022-03-18 NOTE — PROGRESS NOTE ADULT - REASON FOR ADMISSION
volume overload secondary to chronic kidney disease

## 2022-03-18 NOTE — PROGRESS NOTE ADULT - PROBLEM SELECTOR PLAN 1
Pt. with advanced CKD stage 5 and fluid overload. Pt. follows Op Nephrologist Dr. Mccord and was recently seen in Feb 2022. Pt. with advanced CKD and has prior refused to initiate HD. Most recent Scr on OP labs was elevated at 4.38 on 02/7/22. Scr on admission elevated at 4.6 and BUN of 60.  Today SCr 5.3 & BUN 95. UA done on admission showed hematuria and proteinuria. Pt. failed Torsemide 30 mg PO at home. CXR with improved pulmonary vascular congestion.. s/p IV bumex.  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     Creatinine unchanged but BUN higher. Today's labs pending. Does not appear uremic  Volume status with improvement  Continue  Bumex 2mg PO bid.   Will have to tolerate some degree of azotemia to maintain euvolemia.  Please stop aluminium containing antacids given advanced CKD   Pt with hx of  BPH,  bladder scan with 75cc PVR. Strict I & O. Monitor labs and urine output. Avoid nephrotoxins. Dose medications as per eGFR. Needs to be OOB. PT eval for return to Assisted living

## 2022-03-18 NOTE — PROGRESS NOTE ADULT - ATTENDING COMMENTS
Rest per Dr. Juice Willoughby MD  O:   C: [phone]
Rest per Dr. Juice Willoughby MD  O:   C: [phone]
-Patient breathing better. No cough. Lungs clear.   -Cr slightly better but BUN uptrending. -C/w Bumex 2mg PO BID per renal. -F/u VBG. Consider adding Bicitra.   -C/w IV CTX for UTI while admitted but can be switched to oral abx on DC.   -DCP back to [facility]. -CM aware. -COVID swab negative.
Rest per Dr. Juice Willoughby MD  O:   C: [phone]
Rest per Dr. Juice Willoughby MD  O:   C: [phone]

## 2022-03-18 NOTE — PROGRESS NOTE ADULT - ASSESSMENT
86 year old male with a PMHx of HTN, BPH, and CKD Stage V (deferring dialysis) presenting with volume overload secondary to renal dysfunction.
86-year-old male with PMH of CKD stage 5 presents to ER with worsening SOB. Pt. states he felt JUAREZ and was unable to lay down flat and hence had to come to ER. Pt. follows Op Nephrologist Dr. Mccord and was recently seen in Feb 2022. Pt. with advanced CKD and has prior refused to initiate HD. Most recent Scr on OP labs was elevated at 4.38 on 02/7/22. Scr on admission elevated at 4.6 and BUN of 60. UA done on admission showed hematuria and proteinuria. Pt. failed Torsemide 30 mg PO at home. Now on IV diuresis
86-year-old male with PMH of CKD stage 5 presents to ER with worsening SOB. Pt. states he felt JUAREZ and was unable to lay down flat and hence had to come to ER. Pt. follows Op Nephrologist Dr. Mccord and was recently seen in Feb 2022. Pt. with advanced CKD and has prior refused to initiate HD. Most recent Scr on OP labs was elevated at 4.38 on 02/7/22. Scr on admission elevated at 4.6 and BUN of 60. UA done on admission showed hematuria and proteinuria. Pt. failed Torsemide 30 mg PO at home. Now on IV diuresis
86 year old male with a PMHx of HTN, BPH, and CKD Stage V (deferring dialysis) presenting with volume overload secondary to renal dysfunction.
86-year-old male with PMH of CKD stage 5 presents to ER with worsening SOB. Pt. states he felt JUAREZ and was unable to lay down flat and hence had to come to ER. Pt. follows Op Nephrologist Dr. Mccord and was recently seen in Feb 2022. Pt. with advanced CKD and has prior refused to initiate HD. Most recent Scr on OP labs was elevated at 4.38 on 02/7/22. Scr on admission elevated at 4.6 and BUN of 60. UA done on admission showed hematuria and proteinuria. Pt. failed Torsemide 30 mg PO at home. s/p IV diuresis
86-year-old male with PMH of CKD stage 5 presents to ER with worsening SOB. Pt. states he felt JUAREZ and was unable to lay down flat and hence had to come to ER. Pt. follows Op Nephrologist Dr. Mccord and was recently seen in Feb 2022. Pt. with advanced CKD and has prior refused to initiate HD. Most recent Scr on OP labs was elevated at 4.38 on 02/7/22. Scr on admission elevated at 4.6 and BUN of 60. UA done on admission showed hematuria and proteinuria. Pt. failed Torsemide 30 mg PO at home. Now on IV diuresis
86 year old male with a PMHx of HTN, BPH, and CKD Stage V (deferring dialysis) presenting with volume overload secondary to renal dysfunction.

## 2022-03-18 NOTE — PROGRESS NOTE ADULT - PROBLEM SELECTOR PROBLEM 2
Metabolic acidosis
Hypertension
Metabolic acidosis
Hypertension

## 2022-03-18 NOTE — PROGRESS NOTE ADULT - PROBLEM SELECTOR PROBLEM 3
Renal bone disease
History of BPH
Renal bone disease
History of BPH

## 2022-03-18 NOTE — PROGRESS NOTE ADULT - PROBLEM SELECTOR PLAN 4
- DVT ppx: heparin subq  - GI ppx: none  - Diet: renal, dash  - Code status: DNR/DNI-- confirmed at the time of admission  - HCP: Reilly Chawla (c: [phone]/h: [phone]), nephew. -updated him on 3/15.   -PT eval recs home PT. -He lives in assisted living.    5. Ok for DC today with outpatient renal/pmd follow up. -Discussed with CM. -PT f/u appreciated. -Discussed with ANGY Varma. -36 minutes spent on the discharge process.

## 2022-03-18 NOTE — PROGRESS NOTE ADULT - PROBLEM SELECTOR PLAN 2
- continue home amlodipine 5mg daily  -HbA1c in am for elevated BG - 7.8 consistent with DM.  -OMER QAC/ for now. However, will likely not send on oral meds at this time given his age.   -RAZIA rushing.
- continue home amlodipine 5mg daily  -HbA1c in am for elevated BG - 7.8 consistent with DM.  -OMER QAC/HS for now. However, will likely not send on oral meds at this time given his age.   -RAZIA rushing appreciated.
- continue home amlodipine 5mg daily  -HbA1c in am for elevated BG.
- continue home amlodipine 5mg daily  -HbA1c in am for elevated BG - 7.8 consistent with DM.  -OMER QAC/HS for now. However, will likely not send on oral meds at this time given his age.   -RAZIA rushing appreciated.
AGMA likely in the setting of renal failure  Lactate wnl  No ketones in urine  c/w bicitra 15 mg PO bid
- continue home amlodipine 5mg daily  -HbA1c in am for elevated BG - 7.8 consistent with DM.  -OMER QAC/ for now. However, will likely not send on oral meds at this time given his age.   -RAZIA rushing.
AGMA likely in the setting of renal failure  Lactate wnl  No ketones in urine  Start bicitra 15 mg PO bid
- continue home amlodipine 5mg daily  -HbA1c in am for elevated BG - 7.8 consistent with DM.  -OMER QAC/HS for now. However, will likely not send on oral meds at this time given his age.   -RAZIA rushing appreciated.
- continue home amlodipine 5mg daily  -HbA1c in am for elevated BG - 7.8 consistent with DM.  -OMER QAC/HS for now. However, will likely not send on oral meds at this time given his age.   -RAZIA rushing appreciated.

## 2022-03-18 NOTE — CHART NOTE - NSCHARTNOTEFT_GEN_A_CORE
Request from Dr. Dong to facilitate patient discharge.  Medication reconciliation reviewed, revised, and resolved with Dr. Dong, who has medically cleared patient for discharge with follow up as advised.  Please refer to discharge note for detailed hospital course.

## 2022-03-18 NOTE — PROGRESS NOTE ADULT - PROBLEM SELECTOR PLAN 3
Phos 5.5  Low phos diet  Not on binders
- continue home tamsulosin increased to 0.8mg qhs.
Phos 5.5  Low phos diet  Not on binders
- continue home tamsulosin increased to 0.8mg qhs.

## 2022-03-18 NOTE — PROGRESS NOTE ADULT - PROBLEM SELECTOR PROBLEM 1
Stage 5 chronic kidney disease not on chronic dialysis

## 2022-03-22 ENCOUNTER — NON-APPOINTMENT (OUTPATIENT)
Age: 87
End: 2022-03-22

## 2022-03-30 ENCOUNTER — APPOINTMENT (OUTPATIENT)
Dept: NEPHROLOGY | Facility: CLINIC | Age: 87
End: 2022-03-30

## 2022-04-06 RX ORDER — TORSEMIDE 10 MG/1
10 TABLET ORAL DAILY
Qty: 90 | Refills: 3 | Status: DISCONTINUED | COMMUNITY
Start: 2022-02-07 | End: 2022-04-06

## 2022-05-05 RX ORDER — WHEELCHAIR
EACH MISCELLANEOUS
Qty: 1 | Refills: 0 | Status: ACTIVE | OUTPATIENT
Start: 2022-05-05

## 2022-05-20 DIAGNOSIS — R29.898 OTHER SYMPTOMS AND SIGNS INVOLVING THE MUSCULOSKELETAL SYSTEM: ICD-10-CM

## 2022-05-23 ENCOUNTER — APPOINTMENT (OUTPATIENT)
Dept: UROLOGY | Facility: CLINIC | Age: 87
End: 2022-05-23

## 2022-05-27 PROBLEM — N18.5 CHRONIC KIDNEY DISEASE, STAGE 5: Chronic | Status: ACTIVE | Noted: 2022-03-11

## 2022-06-03 ENCOUNTER — APPOINTMENT (OUTPATIENT)
Dept: ORTHOPEDIC SURGERY | Facility: CLINIC | Age: 87
End: 2022-06-03

## 2022-06-17 ENCOUNTER — APPOINTMENT (OUTPATIENT)
Dept: NEPHROLOGY | Facility: CLINIC | Age: 87
End: 2022-06-17
Payer: MEDICARE

## 2022-06-17 VITALS
DIASTOLIC BLOOD PRESSURE: 73 MMHG | BODY MASS INDEX: 30.32 KG/M2 | HEIGHT: 65 IN | HEART RATE: 80 BPM | WEIGHT: 182 LBS | OXYGEN SATURATION: 95 % | SYSTOLIC BLOOD PRESSURE: 129 MMHG | TEMPERATURE: 97.6 F

## 2022-06-17 DIAGNOSIS — F32.9 MAJOR DEPRESSIVE DISORDER, SINGLE EPISODE, UNSPECIFIED: ICD-10-CM

## 2022-06-17 DIAGNOSIS — L29.9 PRURITUS, UNSPECIFIED: ICD-10-CM

## 2022-06-17 PROCEDURE — 99213 OFFICE O/P EST LOW 20 MIN: CPT

## 2022-06-17 RX ORDER — SODIUM CITRATE AND CITRIC ACID 334; 500 MG/5ML; MG/5ML
500-334 SOLUTION ORAL
Qty: 946 | Refills: 0 | Status: DISCONTINUED | COMMUNITY
Start: 2022-03-18

## 2022-06-17 RX ORDER — BUMETANIDE 2 MG/1
2 TABLET ORAL TWICE DAILY
Qty: 60 | Refills: 3 | Status: DISCONTINUED | COMMUNITY
Start: 2022-04-06 | End: 2022-06-17

## 2022-06-17 RX ORDER — CLOBETASOL PROPIONATE 0.5 MG/G
0.05 OINTMENT TOPICAL
Qty: 60 | Refills: 0 | Status: ACTIVE | COMMUNITY
Start: 2022-02-17

## 2022-06-17 RX ORDER — SERTRALINE HYDROCHLORIDE 50 MG/1
50 TABLET, FILM COATED ORAL DAILY
Qty: 90 | Refills: 3 | Status: ACTIVE | COMMUNITY
Start: 2022-04-06

## 2022-06-17 NOTE — ASSESSMENT
[FreeTextEntry1] : 1. Depression: although it remains an issue manifested by tendency to social isolation and poor participation in physical therapy, the patient appears less depressed. Will increase the dose of Sertraline to 50 mg at night.\par 2. CKD stage V. serum creatinine is stable at 4.0 mg/dl. Potassium is normal. No anemia of CKD. \par 3. Itching. Unclear etiology. Continue w/ Hydroxyzine and steroid cream. Nail hygiene to avoid cellulitis. \par Return in 2 months.

## 2022-06-17 NOTE — HISTORY OF PRESENT ILLNESS
[FreeTextEntry1] : Last visit in my office was in February.  The patient has been actively followed by Nette, from the [facility]. Because administrative changes at the [facility] it has been difficult to obtain information. The nephew, Abel, is present at this visit. \par The patient has similar complaints as in the past. His itching is mostly when he is supine (he spends time in bed also during the day). He applies the prescribed cream twice a day.  He states he does not have much of an appetite but his weight is stable.  He goes for PT at times, but not if the feels tired. \par The patient had labs done last week and they will be reviewed at this visit.

## 2022-06-17 NOTE — REVIEW OF SYSTEMS
[Feeling Tired] : feeling tired [Loss Of Hearing] : hearing loss [Chest Pain] : no chest pain [SOB on Exertion] : shortness of breath during exertion [Nocturia] : nocturia [Arthralgias] : arthralgias [As Noted in HPI] : as noted in HPI [Depression] : depression [Negative] : Gastrointestinal

## 2022-06-17 NOTE — PHYSICAL EXAM
[General Appearance - Alert] : alert [Sclera] : the sclera and conjunctiva were normal [Jugular Venous Distention Increased] : there was no jugular-venous distention [Auscultation Breath Sounds / Voice Sounds] : lungs were clear to auscultation bilaterally [Heart Sounds] : normal S1 and S2 [Heart Sounds S4] : An S4 was heard [Systolic grade ___/6] : A grade [unfilled]/6 systolic murmur was heard. [Full Pulse] : the pedal pulses are present [Edema] : there was no peripheral edema [Abdomen Tenderness] : non-tender [] : no hepato-splenomegaly [No Spinal Tenderness] : no spinal tenderness [No Focal Deficits] : no focal deficits [Oriented To Time, Place, And Person] : oriented to person, place, and time [FreeTextEntry1] : Appears less depressed than the last exam.

## 2022-07-06 ENCOUNTER — RX RENEWAL (OUTPATIENT)
Age: 87
End: 2022-07-06

## 2022-08-19 ENCOUNTER — APPOINTMENT (OUTPATIENT)
Dept: NEPHROLOGY | Facility: CLINIC | Age: 87
End: 2022-08-19

## 2022-08-19 VITALS
TEMPERATURE: 97.7 F | SYSTOLIC BLOOD PRESSURE: 125 MMHG | HEIGHT: 65 IN | DIASTOLIC BLOOD PRESSURE: 75 MMHG | WEIGHT: 180 LBS | BODY MASS INDEX: 29.99 KG/M2 | OXYGEN SATURATION: 96 % | HEART RATE: 69 BPM

## 2022-08-19 VITALS
HEIGHT: 65 IN | OXYGEN SATURATION: 96 % | SYSTOLIC BLOOD PRESSURE: 125 MMHG | DIASTOLIC BLOOD PRESSURE: 75 MMHG | BODY MASS INDEX: 29.99 KG/M2 | RESPIRATION RATE: 15 BRPM | HEART RATE: 69 BPM | WEIGHT: 180 LBS

## 2022-08-19 DIAGNOSIS — F32.A DEPRESSION, UNSPECIFIED: ICD-10-CM

## 2022-08-19 DIAGNOSIS — H90.5 UNSPECIFIED SENSORINEURAL HEARING LOSS: ICD-10-CM

## 2022-08-19 LAB
ALBUMIN SERPL ELPH-MCNC: 4.6 G/DL
ALP BLD-CCNC: 96 U/L
ALT SERPL-CCNC: 10 U/L
ANION GAP SERPL CALC-SCNC: 17 MMOL/L
AST SERPL-CCNC: 11 U/L
BASOPHILS # BLD AUTO: 0.06 K/UL
BASOPHILS NFR BLD AUTO: 0.8 %
BILIRUB SERPL-MCNC: 0.4 MG/DL
BUN SERPL-MCNC: 52 MG/DL
CALCIUM SERPL-MCNC: 9.9 MG/DL
CHLORIDE SERPL-SCNC: 103 MMOL/L
CO2 SERPL-SCNC: 23 MMOL/L
CREAT SERPL-MCNC: 4.81 MG/DL
EGFR: 11 ML/MIN/1.73M2
EOSINOPHIL # BLD AUTO: 0.49 K/UL
EOSINOPHIL NFR BLD AUTO: 6.2 %
GLUCOSE SERPL-MCNC: 128 MG/DL
HCT VFR BLD CALC: 39.7 %
HGB BLD-MCNC: 13.1 G/DL
IMM GRANULOCYTES NFR BLD AUTO: 0.8 %
LYMPHOCYTES # BLD AUTO: 1.33 K/UL
LYMPHOCYTES NFR BLD AUTO: 16.9 %
MAN DIFF?: NORMAL
MCHC RBC-ENTMCNC: 28.7 PG
MCHC RBC-ENTMCNC: 33 GM/DL
MCV RBC AUTO: 87.1 FL
MONOCYTES # BLD AUTO: 0.75 K/UL
MONOCYTES NFR BLD AUTO: 9.6 %
NEUTROPHILS # BLD AUTO: 5.16 K/UL
NEUTROPHILS NFR BLD AUTO: 65.7 %
PHOSPHATE SERPL-MCNC: 4.6 MG/DL
PLATELET # BLD AUTO: 200 K/UL
POTASSIUM SERPL-SCNC: 4.2 MMOL/L
PROT SERPL-MCNC: 7.7 G/DL
RBC # BLD: 4.56 M/UL
RBC # FLD: 13.6 %
SODIUM SERPL-SCNC: 143 MMOL/L
WBC # FLD AUTO: 7.85 K/UL

## 2022-08-19 PROCEDURE — 99213 OFFICE O/P EST LOW 20 MIN: CPT

## 2022-08-19 NOTE — REASON FOR VISIT
[Follow-Up] : a follow-up visit [Family Member] : family member [FreeTextEntry1] : For CKD IV and depression.

## 2022-08-19 NOTE — HISTORY OF PRESENT ILLNESS
[FreeTextEntry1] : Last visit in my office was in June.  \par Main complain today is feeling sleepy.  He is always very tired. Engages with physical therapy once a week on Thursday only. Does not go down to the dining wall to eat w/ the other residents. \par He has noted a significant improvement in the itching. He takes the Atarax every night even if he does not need it.\par He denies urinary or bowel complaints. Dyspnea on exertion but not at rest. No chest pain. \par Purpose of the visit is follow up for conservative management of stage V CKD.

## 2022-08-19 NOTE — ASSESSMENT
[FreeTextEntry1] : 1. Depression: Continue same dose of Sertraline\par 2. CKD stage V. Will check labs today\par 3. Drowsiness: will change Hydroxyzine to PRN and itching has significantly improved.\par 4. Hearing loss: discuss that deafness leads to social isolation, higher incidence of dementia. The patient has been resistant to wear hearing aids.  He now has agree to get fit with them. \par Return in 2 months.\par

## 2022-08-19 NOTE — REVIEW OF SYSTEMS
[Feeling Tired] : feeling tired [Loss Of Hearing] : hearing loss [SOB on Exertion] : shortness of breath during exertion [Nocturia] : nocturia [Arthralgias] : arthralgias [As Noted in HPI] : as noted in HPI [Depression] : depression [Negative] : Gastrointestinal [Chest Pain] : no chest pain

## 2022-08-19 NOTE — PHYSICAL EXAM
[General Appearance - Alert] : alert [Sclera] : the sclera and conjunctiva were normal [Jugular Venous Distention Increased] : there was no jugular-venous distention [Auscultation Breath Sounds / Voice Sounds] : lungs were clear to auscultation bilaterally [Heart Sounds] : normal S1 and S2 [Heart Sounds S4] : An S4 was heard [Systolic grade ___/6] : A grade [unfilled]/6 systolic murmur was heard. [Full Pulse] : the pedal pulses are present [Edema] : there was no peripheral edema [Abdomen Tenderness] : non-tender [] : no hepato-splenomegaly [No Spinal Tenderness] : no spinal tenderness [No Focal Deficits] : no focal deficits [Oriented To Time, Place, And Person] : oriented to person, place, and time [Hearing Loss Right Only] : diminished [Hearing Loss Left Only] : dimished [Hearing Threshold Whispered Voice Not [place]] : whispered voice was not heard [Hearing Threshold Finger Rub Not [place]] : finger rub was not heard [FreeTextEntry1] : Appears less depressed than the last exam.

## 2022-09-22 ENCOUNTER — RX CHANGE (OUTPATIENT)
Age: 87
End: 2022-09-22

## 2022-09-22 RX ORDER — TAMSULOSIN HYDROCHLORIDE 0.4 MG/1
0.4 CAPSULE ORAL
Qty: 90 | Refills: 3 | Status: ACTIVE | COMMUNITY
Start: 1900-01-01 | End: 1900-01-01

## 2022-10-05 RX ORDER — TORSEMIDE 10 MG/1
10 TABLET ORAL
Qty: 90 | Refills: 3 | Status: ACTIVE | COMMUNITY
Start: 2022-06-17 | End: 1900-01-01

## 2022-10-08 ENCOUNTER — RX RENEWAL (OUTPATIENT)
Age: 87
End: 2022-10-08

## 2022-10-08 RX ORDER — AMLODIPINE BESYLATE 5 MG/1
5 TABLET ORAL
Qty: 90 | Refills: 1 | Status: ACTIVE | COMMUNITY
Start: 2022-07-06 | End: 1900-01-01

## 2022-10-28 ENCOUNTER — APPOINTMENT (OUTPATIENT)
Dept: NEPHROLOGY | Facility: CLINIC | Age: 87
End: 2022-10-28

## 2022-10-28 VITALS
OXYGEN SATURATION: 97 % | RESPIRATION RATE: 15 BRPM | DIASTOLIC BLOOD PRESSURE: 70 MMHG | HEART RATE: 66 BPM | SYSTOLIC BLOOD PRESSURE: 140 MMHG | HEIGHT: 65 IN | BODY MASS INDEX: 29.32 KG/M2 | WEIGHT: 176 LBS

## 2022-10-28 DIAGNOSIS — R60.0 LOCALIZED EDEMA: ICD-10-CM

## 2022-10-28 PROCEDURE — 99213 OFFICE O/P EST LOW 20 MIN: CPT

## 2022-10-28 RX ORDER — BETAMETHASONE VALERATE 1 MG/G
0.1 CREAM TOPICAL
Qty: 45 | Refills: 0 | Status: DISCONTINUED | COMMUNITY
Start: 2022-10-13

## 2022-10-28 NOTE — REVIEW OF SYSTEMS
[Feeling Tired] : feeling tired [Loss Of Hearing] : hearing loss [Palpitations] : no palpitations [SOB on Exertion] : shortness of breath during exertion [Nocturia] : nocturia [Arthralgias] : arthralgias [Depression] : depression [Negative] : Neurological [de-identified] : Eczema

## 2022-10-28 NOTE — ASSESSMENT
[FreeTextEntry1] : 1 Edema has resolved.\par 2 Hypertension is controlled.\par 3 CKD stage V. The patient has early sign of ESKD including decrease appetite, loss of muscle mass and tiredness. We discuss the importance of physical therapy to slow down muscle atrophy. The patient should take 2 extra strength Tylenol half hour before therapy to help control the arthritis pain.  The option of trying dialysis when he becomes sick was again discussed w/ the patient. At this point he has not changed his mind.\par Return in January. Labs before visit.

## 2022-10-28 NOTE — HISTORY OF PRESENT ILLNESS
[FreeTextEntry1] : Today is his birthday.  He states that he usually feels tired and today the tiredness is worse.  He has been skipping physical therapy because of joint pain.  He is afraid the the therapy is going to make the pain worse. Poor appetite but loves to have a hamburger.....\par Labs were done and were reviewed at the time of this visit. The creatinine is the same, but the patient has lost muscle mass.  His albumin is 4.3.  The Hemoglobin is 12.9. \par The purpose of this visit is to continue to monitor the volume status and blood pressure and offer assistance with the conservative management of stage V CKD.

## 2022-10-28 NOTE — PHYSICAL EXAM
[General Appearance - Alert] : alert [Sclera] : the sclera and conjunctiva were normal [Hearing Loss Right Only] : diminished [Hearing Loss Left Only] : dimished [Jugular Venous Distention Increased] : there was no jugular-venous distention [Auscultation Breath Sounds / Voice Sounds] : lungs were clear to auscultation bilaterally [Heart Sounds] : normal S1 and S2 [Systolic grade ___/6] : A grade [unfilled]/6 systolic murmur was heard. [Full Pulse] : the pedal pulses are present [Edema] : there was no peripheral edema [Abdomen Tenderness] : non-tender [] : no hepato-splenomegaly [No Spinal Tenderness] : no spinal tenderness [No Focal Deficits] : no focal deficits [Oriented To Time, Place, And Person] : oriented to person, place, and time [FreeTextEntry1] : .

## 2022-11-17 ENCOUNTER — NON-APPOINTMENT (OUTPATIENT)
Age: 87
End: 2022-11-17

## 2022-11-17 ENCOUNTER — APPOINTMENT (OUTPATIENT)
Dept: INTERNAL MEDICINE | Facility: CLINIC | Age: 87
End: 2022-11-17

## 2022-11-17 VITALS
DIASTOLIC BLOOD PRESSURE: 76 MMHG | OXYGEN SATURATION: 97 % | WEIGHT: 165.38 LBS | SYSTOLIC BLOOD PRESSURE: 142 MMHG | HEIGHT: 65 IN | BODY MASS INDEX: 27.56 KG/M2 | HEART RATE: 86 BPM

## 2022-11-17 DIAGNOSIS — I10 ESSENTIAL (PRIMARY) HYPERTENSION: ICD-10-CM

## 2022-11-17 DIAGNOSIS — Z00.00 ENCOUNTER FOR GENERAL ADULT MEDICAL EXAMINATION W/OUT ABNORMAL FINDINGS: ICD-10-CM

## 2022-11-17 DIAGNOSIS — K57.92 DIVERTICULITIS OF INTESTINE, PART UNSPECIFIED, W/OUT PERFORATION OR ABSCESS W/OUT BLEEDING: ICD-10-CM

## 2022-11-17 PROCEDURE — G0439: CPT

## 2022-11-17 PROCEDURE — 36415 COLL VENOUS BLD VENIPUNCTURE: CPT

## 2022-11-17 PROCEDURE — 93000 ELECTROCARDIOGRAM COMPLETE: CPT | Mod: 59

## 2022-11-17 RX ORDER — DICLOFENAC SODIUM 1% 10 MG/G
1 GEL TOPICAL
Qty: 1 | Refills: 2 | Status: DISCONTINUED | COMMUNITY
Start: 2022-03-02 | End: 2022-11-17

## 2022-11-17 RX ORDER — MOMETASONE FUROATE 1 MG/ML
0.1 SOLUTION TOPICAL
Qty: 120 | Refills: 0 | Status: DISCONTINUED | COMMUNITY
Start: 2022-02-25 | End: 2022-11-17

## 2022-11-17 RX ORDER — BIFIDOBACTERIUM LONGUM 10MM CELL
4 CAPSULE ORAL
Qty: 90 | Refills: 1 | Status: ACTIVE | COMMUNITY
Start: 2022-11-17 | End: 1900-01-01

## 2022-11-17 NOTE — HISTORY OF PRESENT ILLNESS
[de-identified] : 87 year old male h/o HTN, CKD stage IV, BPH, depression presents for annual exam.\par accompanied by nephAbel ruth\par \par needs forms filled out for assisting living \par \par Has been following up with nephrology, \par \par noted to have some weight loss, with decreased appetite and diarrhea the occurs intermittently, last episode yesturday.  \par \par \par \par

## 2022-11-17 NOTE — HEALTH RISK ASSESSMENT
[Fair] :  ~his/her~ mood as fair [No falls in past year] : Patient reported no falls in the past year [Assistive Device] : Patient uses an assistive device [0] : 2) Feeling down, depressed, or hopeless: Not at all (0) [PHQ-2 Negative - No further assessment needed] : PHQ-2 Negative - No further assessment needed [de-identified] : in wheelchair  [[id number]] : 0

## 2022-11-17 NOTE — REVIEW OF SYSTEMS
[Fatigue] : fatigue [Recent Change In Weight] : ~T recent weight change [Diarrhea] : diarrhea [Negative] : Psychiatric

## 2022-11-17 NOTE — ASSESSMENT
[FreeTextEntry1] : 87 year old male h/o HTN, CKD, BPH, depression presents for follow-up.\par \par noted to have some weight loss, with decreased appetite and diarrhea the occurs intermittently, last episode yesterday.  tenderness in LLQ.  can be related to acute uncomplicated diverticulitis\par -Will treat prophylactically at length Augmentin\par -Start Bacid daily\par -If tenderness continues after course of antibiotics will get CT abdomen\par \par CKD, stage IV.   reluctant to start dialysis.  Follows with nephrology.\par -check labs\par \par depression-stable \par -cont zoloft,\par \par HTN, controlled \par -cont amlodipine\par \par LBBB, st depression, on ecg.  grossly unchanged.  unable to sit for echo earlier this year.  no cp \par -monitor as would refrain from aggressive measures given CKD\par \par Annual \par -Advise to get yearly Flu shot -up to date \par -Advise Yearly Skin cancer screening with Dermatologist \par -Advise Yearly Eye exam with Ophthalmologist\par -Advise Yearly Dental exam\par -Educated of the importance of Healthy diet, such as Mediterranean Diet and Exercise, such as walking >20 minutes a day and increasing gradually as tolerated\par \par -covid -recommend booster\par \par \par \par \par \par \par \par \par \par \par \par

## 2022-11-17 NOTE — PHYSICAL EXAM
[Normal] : no respiratory distress, lungs were clear to auscultation bilaterally and no accessory muscle use [Normal Rate] : normal rate  [Regular Rhythm] : with a regular rhythm [Normal S1, S2] : normal S1 and S2 [No Varicosities] : no varicosities [Pedal Pulses Present] : the pedal pulses are present [No Edema] : there was no peripheral edema [No Extremity Clubbing/Cyanosis] : no extremity clubbing/cyanosis [Soft] : abdomen soft [Non-distended] : non-distended [Normal Bowel Sounds] : normal bowel sounds [Normal Posterior Cervical Nodes] : no posterior cervical lymphadenopathy [Normal Anterior Cervical Nodes] : no anterior cervical lymphadenopathy [No Rash] : no rash [de-identified] : hard of hearing  [de-identified] : not performed as wearing mask due to covid precautions [de-identified] : +systolic murmur  [de-identified] : tenderness over left lower quadrant  [de-identified] : anxious at times

## 2022-11-20 ENCOUNTER — CLINICAL ADVICE (OUTPATIENT)
Age: 87
End: 2022-11-20

## 2022-11-20 LAB
ALBUMIN SERPL ELPH-MCNC: 4.3 G/DL
ALP BLD-CCNC: 120 U/L
ALT SERPL-CCNC: 10 U/L
ANION GAP SERPL CALC-SCNC: 17 MMOL/L
APPEARANCE: CLEAR
AST SERPL-CCNC: 9 U/L
BACTERIA: NEGATIVE
BASOPHILS # BLD AUTO: 0.05 K/UL
BASOPHILS NFR BLD AUTO: 0.6 %
BILIRUB DIRECT SERPL-MCNC: 0.1 MG/DL
BILIRUB INDIRECT SERPL-MCNC: 0.2 MG/DL
BILIRUB SERPL-MCNC: 0.3 MG/DL
BILIRUBIN URINE: NEGATIVE
BLOOD URINE: NEGATIVE
BUN SERPL-MCNC: 77 MG/DL
CALCIUM SERPL-MCNC: 9.5 MG/DL
CHLORIDE SERPL-SCNC: 105 MMOL/L
CHOLEST SERPL-MCNC: 155 MG/DL
CO2 SERPL-SCNC: 19 MMOL/L
COLOR: NORMAL
CREAT SERPL-MCNC: 5.2 MG/DL
EGFR: 10 ML/MIN/1.73M2
EOSINOPHIL # BLD AUTO: 0.52 K/UL
EOSINOPHIL NFR BLD AUTO: 6.3 %
ESTIMATED AVERAGE GLUCOSE: 134 MG/DL
GLUCOSE QUALITATIVE U: NEGATIVE
GLUCOSE SERPL-MCNC: 140 MG/DL
HBA1C MFR BLD HPLC: 6.3 %
HCT VFR BLD CALC: 41.3 %
HDLC SERPL-MCNC: 49 MG/DL
HGB BLD-MCNC: 13.2 G/DL
HYALINE CASTS: 0 /LPF
IMM GRANULOCYTES NFR BLD AUTO: 0.5 %
KETONES URINE: NEGATIVE
LDLC SERPL CALC-MCNC: 70 MG/DL
LEUKOCYTE ESTERASE URINE: NEGATIVE
LYMPHOCYTES # BLD AUTO: 1.29 K/UL
LYMPHOCYTES NFR BLD AUTO: 15.7 %
MAN DIFF?: NORMAL
MCHC RBC-ENTMCNC: 28 PG
MCHC RBC-ENTMCNC: 32 GM/DL
MCV RBC AUTO: 87.5 FL
MICROSCOPIC-UA: NORMAL
MONOCYTES # BLD AUTO: 0.7 K/UL
MONOCYTES NFR BLD AUTO: 8.5 %
NEUTROPHILS # BLD AUTO: 5.64 K/UL
NEUTROPHILS NFR BLD AUTO: 68.4 %
NITRITE URINE: NEGATIVE
NONHDLC SERPL-MCNC: 106 MG/DL
PH URINE: 6
PLATELET # BLD AUTO: 228 K/UL
POTASSIUM SERPL-SCNC: 3.7 MMOL/L
PROT SERPL-MCNC: 7.7 G/DL
PROTEIN URINE: ABNORMAL
RBC # BLD: 4.72 M/UL
RBC # FLD: 14.2 %
RED BLOOD CELLS URINE: 0 /HPF
SODIUM SERPL-SCNC: 141 MMOL/L
SPECIFIC GRAVITY URINE: 1.01
SQUAMOUS EPITHELIAL CELLS: 1 /HPF
TRIGL SERPL-MCNC: 185 MG/DL
TSH SERPL-ACNC: 1.06 UIU/ML
URINE COMMENTS: NORMAL
UROBILINOGEN URINE: NORMAL
WBC # FLD AUTO: 8.24 K/UL
WHITE BLOOD CELLS URINE: 2 /HPF

## 2022-11-22 ENCOUNTER — NON-APPOINTMENT (OUTPATIENT)
Age: 87
End: 2022-11-22

## 2022-11-22 RX ORDER — AMOXICILLIN AND CLAVULANATE POTASSIUM 250; 125 MG/1; MG/1
250-125 TABLET, FILM COATED ORAL
Qty: 10 | Refills: 0 | Status: ACTIVE | COMMUNITY
Start: 2022-11-17

## 2022-11-23 ENCOUNTER — NON-APPOINTMENT (OUTPATIENT)
Age: 87
End: 2022-11-23

## 2022-12-16 ENCOUNTER — APPOINTMENT (OUTPATIENT)
Dept: ORTHOPEDIC SURGERY | Facility: CLINIC | Age: 87
End: 2022-12-16

## 2022-12-16 VITALS — WEIGHT: 175 LBS | HEIGHT: 65 IN | BODY MASS INDEX: 29.16 KG/M2

## 2022-12-16 DIAGNOSIS — M25.511 PAIN IN RIGHT SHOULDER: ICD-10-CM

## 2022-12-16 DIAGNOSIS — M25.521 PAIN IN RIGHT ELBOW: ICD-10-CM

## 2022-12-16 DIAGNOSIS — G89.29 PAIN IN RIGHT SHOULDER: ICD-10-CM

## 2022-12-16 PROCEDURE — 20610 DRAIN/INJ JOINT/BURSA W/O US: CPT | Mod: RT

## 2022-12-16 PROCEDURE — 99214 OFFICE O/P EST MOD 30 MIN: CPT | Mod: 25

## 2022-12-16 NOTE — HISTORY OF PRESENT ILLNESS
[de-identified] : 86 y.o M presents to the office complaining of years of chronic R sided shoulder and R elbow pain. Complains of pain and difficulty with overhead activity. Denies traumatic OLE. Denies previous history of injury to either shoulder. Denies mechanical symptoms. Complains of radiating pain from shoulder to R elbow. Denies NT. Here today to discuss treatment options for involved injury.

## 2022-12-16 NOTE — DISCUSSION/SUMMARY
[de-identified] : Right shoulder impingement rotator cuff tendinopathy pain unchanged from several months ago we discussed diagnostic and treatment options.  Requested injection today.\par \par Injection: Right shoulder (Subacromial).\par Indication: Rotator cuff tendinopathy.\par \par A discussion was had with the patient regarding this procedure and all questions were answered. All risks, benefits and alternatives were discussed. These included but were not limited to bleeding, infection, and allergic reaction. Alcohol was used to clean the skin, and betadine was used to sterilize and prep the area in the posterior aspect of the right shoulder. Ethyl chloride spray was then used as a topical anesthetic. A 21-gauge needle was used to inject 4cc of 1% lidocaine and 1cc of 40mg/ml methylprednisolone into the right subacromial space. A sterile bandage was then applied. The patient tolerated the procedure well and there were no complications. \par \par All questions answered follow-up as needed

## 2022-12-16 NOTE — PHYSICAL EXAM
[de-identified] : Right shoulder exam\par \par Inspection: No swelling, ecchymosis or gross deformity.\par Skin: No masses, No lesions\par Tenderness: No bicipital tenderness, no tenderness to the greater tuberosity/RTC insertion, no anterior shoulder/lesser tuberosity tenderness. No tenderness SC joint, clavicle, AC joint.\par ROM: 140/60/T12\par Impingement tests: Positive Morales\par AC Joint: no pain with cross arm testing\par Biceps: Negative speed\par Strength: 4+/5 abduction, external rotation, and internal rotation\par Neuro: AIN, PIN, Ulnar nerve motor intact\par Sensation: Intact to light touch in radial, median, ulnar, and axillary nerve distributions\par Vasc: 2+ radial pulse\par \par right elbow exam:\par \par Skin: Clean, dry, intact. No ecchymosis. No swelling. No palpable joint effusion. No gross deformity.\par Tenderness: + tenderness to palpation lateral epicondyle, No medial epicondyle, olecranon, radial head. Pain with resisted wrist ext and supination\par ROM:0-140° full pronation full supination\par Stability: Stable to vaus/valgus stress\par Neuro: Negative tinels at ulnar canal, AIN/PIN/Ulnar nerve in tact to motor/sensation.\par Strength: 5/5 elbow flexion, 5/5 elbow extension, 5/5 supination, 5/5 pronation\par Sensation: In tact to light touch throughout\par Vasc: 2+ radial pulse, <2s cap refill

## 2023-01-03 RX ORDER — HYDROXYZINE HYDROCHLORIDE 10 MG/1
10 TABLET ORAL
Qty: 30 | Refills: 3 | Status: ACTIVE | COMMUNITY
Start: 2022-02-07 | End: 1900-01-01

## 2023-01-12 DIAGNOSIS — N18.5 CHRONIC KIDNEY DISEASE, STAGE 5: ICD-10-CM

## 2023-01-20 ENCOUNTER — APPOINTMENT (OUTPATIENT)
Dept: NEPHROLOGY | Facility: CLINIC | Age: 88
End: 2023-01-20

## 2023-01-25 NOTE — PHYSICAL THERAPY INITIAL EVALUATION ADULT - PERTINENT HX OF CURRENT PROBLEM, REHAB EVAL
86 y/oM with PMH HTN, BPH, and CKD Stage V (deferring dialysis) presents as per instruction by his nephrologist for volume overload. The patient has a longstanding history of CKD and has consistently deferred dialysis. He ambulation is mainly limited by his history of arthritis of bilateral knees, but he does endorse shortness of breath on exertion walking ground level without associated chest pain. Pt is DNR, DNI Cm

## 2023-04-07 ENCOUNTER — TRANSCRIPTION ENCOUNTER (OUTPATIENT)
Age: 88
End: 2023-04-07

## 2023-04-12 ENCOUNTER — NON-APPOINTMENT (OUTPATIENT)
Age: 88
End: 2023-04-12

## 2025-06-25 NOTE — PHYSICAL THERAPY INITIAL EVALUATION ADULT - LEVEL OF CONSCIOUSNESS, REHAB EVAL
Consent: The patient's consent was obtained including but not limited to risks of crusting, scabbing, blistering, scarring, darker or lighter pigmentary change, recurrence, incomplete removal and infection. Duration Of Freeze Thaw-Cycle (Seconds): 0 Render Note In Bullet Format When Appropriate: No Detail Level: Detailed Show Aperture Variable?: Yes Post-Care Instructions: I reviewed with the patient in detail post-care instructions. Patient is to wear sunprotection, and avoid picking at any of the treated lesions. Pt may apply Vaseline to crusted or scabbing areas. alert